# Patient Record
Sex: FEMALE | Race: WHITE | Employment: FULL TIME | URBAN - METROPOLITAN AREA
[De-identification: names, ages, dates, MRNs, and addresses within clinical notes are randomized per-mention and may not be internally consistent; named-entity substitution may affect disease eponyms.]

---

## 2018-12-06 RX ORDER — LEVOTHYROXINE SODIUM 0.03 MG/1
TABLET ORAL
Qty: 30 TABLET | Refills: 3 | Status: SHIPPED | OUTPATIENT
Start: 2018-12-06 | End: 2019-04-07 | Stop reason: SDUPTHER

## 2019-04-11 RX ORDER — LEVOTHYROXINE SODIUM 0.03 MG/1
TABLET ORAL
Qty: 30 TABLET | Refills: 1 | Status: SHIPPED | OUTPATIENT
Start: 2019-04-11 | End: 2019-04-11 | Stop reason: SDUPTHER

## 2019-04-11 RX ORDER — LEVOTHYROXINE SODIUM 0.03 MG/1
25 TABLET ORAL DAILY
Qty: 90 TABLET | Refills: 0 | Status: SHIPPED | OUTPATIENT
Start: 2019-04-11 | End: 2019-07-18 | Stop reason: SDUPTHER

## 2019-04-26 ENCOUNTER — OFFICE VISIT (OUTPATIENT)
Dept: PRIMARY CARE CLINIC | Age: 49
End: 2019-04-26
Payer: COMMERCIAL

## 2019-04-26 VITALS
HEIGHT: 64 IN | WEIGHT: 155.4 LBS | HEART RATE: 60 BPM | OXYGEN SATURATION: 98 % | SYSTOLIC BLOOD PRESSURE: 102 MMHG | DIASTOLIC BLOOD PRESSURE: 64 MMHG | BODY MASS INDEX: 26.53 KG/M2

## 2019-04-26 DIAGNOSIS — B35.3 TINEA PEDIS OF BOTH FEET: ICD-10-CM

## 2019-04-26 DIAGNOSIS — Z13.220 SCREENING CHOLESTEROL LEVEL: ICD-10-CM

## 2019-04-26 DIAGNOSIS — E03.9 HYPOTHYROIDISM, UNSPECIFIED TYPE: Primary | ICD-10-CM

## 2019-04-26 DIAGNOSIS — E55.9 VITAMIN D DEFICIENCY: ICD-10-CM

## 2019-04-26 DIAGNOSIS — M54.2 NECK PAIN ON LEFT SIDE: ICD-10-CM

## 2019-04-26 PROCEDURE — G8427 DOCREV CUR MEDS BY ELIG CLIN: HCPCS | Performed by: FAMILY MEDICINE

## 2019-04-26 PROCEDURE — 1036F TOBACCO NON-USER: CPT | Performed by: FAMILY MEDICINE

## 2019-04-26 PROCEDURE — 99214 OFFICE O/P EST MOD 30 MIN: CPT | Performed by: FAMILY MEDICINE

## 2019-04-26 PROCEDURE — G8419 CALC BMI OUT NRM PARAM NOF/U: HCPCS | Performed by: FAMILY MEDICINE

## 2019-04-26 ASSESSMENT — ENCOUNTER SYMPTOMS: RESPIRATORY NEGATIVE: 1

## 2019-04-26 NOTE — PATIENT INSTRUCTIONS
Patient Education        Learning About Vitamin D  Why is it important to get enough vitamin D? Your body needs vitamin D to absorb calcium. Calcium keeps your bones and muscles, including your heart, healthy and strong. If your muscles don't get enough calcium, they can cramp, hurt, or feel weak. You may have long-term (chronic) muscle aches and pains. If you don't get enough vitamin D throughout life, you have an increased chance of having thin and brittle bones (osteoporosis) in your later years. Children who don't get enough vitamin D may not grow as much as others their age. They also have a chance of getting a rare disease called rickets. It causes weak bones. Vitamin D and calcium are added to many foods. And your body uses sunshine to make its own vitamin D. How much vitamin D do you need? The [employer] recommends that people ages 3 through 79 get 600 IU (international units) every day. Adults 71 and older need 800 IU every day. Blood tests for vitamin D can check your vitamin D level. But there is no standard normal range used by all laboratories. You're likely getting enough vitamin D if your levels are in the range of 20 to 50 ng/mL. How can you get more vitamin D? Foods that contain vitamin D include:  · [place], tuna, and mackerel. These are some of the best foods to eat when you need to get more vitamin D.  · Cheese, egg yolks, and beef liver. These foods have vitamin D in small amounts. · Milk, soy drinks, orange juice, yogurt, margarine, and some kinds of cereal have vitamin D added to them. Some people don't make vitamin D as well as others. They may have to take extra care in getting enough vitamin D. Things that reduce how much vitamin D your body makes include:  · Dark skin, such as many  Americans have. · Age, especially if you are older than 72. · Digestive problems, such as Crohn's or celiac disease. · Liver and kidney disease.   Some people who do not get enough vitamin D may need supplements. Are there any risks from taking vitamin D?  · Too much vitamin D:  ? Can damage your kidneys. ? Can cause nausea and vomiting, constipation, and weakness. ? Raises the amount of calcium in your blood. If this happens, you can get confused or have an irregular heart rhythm. · Vitamin D may interact with other medicines. Tell your doctor about all of the medicines you take, including over-the-counter drugs, herbs, and pills. Tell your doctor about all of your current medical problems. Where can you learn more? Go to https://chpeniceweb.Tango [email or web address] org and sign in to your CollegeSolved account. Enter [id number] in the FinanceAcar box to learn more about \"Learning About Vitamin D. \"     If you do not have an account, please click on the \"Sign Up Now\" link. Current as of: March 28, 2018  Content Version: 11.9  © 7406-9186 [employer]. Care instructions adapted under license by [facility] ([facility]). If you have questions about a medical condition or this instruction, always ask your healthcare professional. [place] [address] any warranty or liability for your use of this information. Patient Education        Hypothyroidism: Care Instructions  Your Care Instructions    You have hypothyroidism, which means that your body is not making enough thyroid hormone. This hormone helps your body use energy. If your thyroid level is low, you may feel tired, be constipated, have an increase in your blood pressure, or have dry skin or memory problems. You may also get cold easily, even when it is warm. Women with low thyroid levels may have heavy menstrual periods. A blood test to find your thyroid-stimulating hormone (TSH) level is used to check for hypothyroidism. A high TSH level may mean that you have low thyroid. When your body is not making enough thyroid hormone, TSH levels rise in an effort to make the body produce more.   The treatment for hypothyroidism is to take thyroid hormone pills. You should start to feel better in 1 to 2 weeks. But it can take several months to see changes in the TSH level. You will need regular visits with your doctor to make sure you have the right dose of medicine. Most people need treatment for the rest of their lives. You will need to see your doctor regularly to have blood tests and to make sure you are doing well. Follow-up care is a key part of your treatment and safety. Be sure to make and go to all appointments, and call your doctor if you are having problems. It's also a good idea to know your test results and keep a list of the medicines you take. How can you care for yourself at home? · Take your thyroid hormone medicine exactly as prescribed. Call your doctor if you think you are having a problem with your medicine. Most people do not have side effects if they take the right amount of medicine regularly. ? Take the medicine 30 minutes before breakfast, and do not take it with calcium, vitamins, or iron. ? Do not take extra doses of your thyroid medicine. It will not help you get better any faster, and it may cause side effects. ? If you forget to take a dose, do NOT take a double dose of medicine. Take your usual dose the next day. · Tell your doctor about all prescription, herbal, or over-the-counter products you take. · Take care of yourself. Eat a healthy diet, get enough sleep, and get regular exercise. When should you call for help? Call 911 anytime you think you may need emergency care.  For example, call if:    · You passed out (lost consciousness).     · You have severe trouble breathing.     · You have a very slow heartbeat (less than 60 beats a minute).     · You have a low body temperature (95°F or below).    Call your doctor now or seek immediate medical care if:    · You feel tired, sluggish, or weak.     · You have trouble remembering things or concentrating.     · You do not begin to feel fungus. You can get it by going barefoot in wet public areas, such as swimming pools or locker rooms. Many times there is no clear reason why you get athlete's foot. You can easily treat athlete's foot by putting medicine on your feet for 1 to 6 weeks. In some cases, a doctor may prescribe pills to kill the fungus. Follow-up care is a key part of your treatment and safety. Be sure to make and go to all appointments, and call your doctor if you are having problems. It's also a good idea to know your test results and keep a list of the medicines you take. How can you care for yourself at home? · Your doctor may suggest an over-the counter lotion or spray or may prescribe a medicine. Take your medicines exactly as prescribed. Call your doctor if you think you are having a problem with your medicine. · Keep your feet clean and dry. · When you get dressed, put your socks on before your underwear. This can prevent the fungus from spreading from your feet to your groin. To prevent athlete's foot  · Wear flip-flops or other shower sandals in public locker rooms and showers and by the pool. · Dry between your toes after swimming or bathing. · Wear leather shoes or sandals, which let air get to your feet. · Change your socks as needed so your feet stay as dry as possible. · Use antifungal powder on your feet. When should you call for help? Watch closely for changes in your health, and be sure to contact your doctor if:    · You do not get better as expected. Where can you learn more? Go to https://jackelyn.UAT Holdings. org and sign in to your SparkLix account. Enter M498 in the [facility] box to learn more about \"Athlete's Foot: Care Instructions. \"     If you do not have an account, please click on the \"Sign Up Now\" link. Current as of: April 17, 2018  Content Version: 11.9  © 8528-7260 [employer]. Care instructions adapted under license by [facility] ([facility]).  If you have questions about a medical condition or this instruction, always ask your healthcare professional. [place] [address] any warranty or liability for your use of this information.

## 2019-04-26 NOTE — PROGRESS NOTES
itching: cracking, tried spray       Objective:     /64   Pulse 60   Ht 5' 3.5\" (1.613 m)   Wt 155 lb 6.4 oz (70.5 kg)   LMP 04/10/2019   SpO2 98%   BMI 27.10 kg/m²   Physical Exam   Constitutional: She is oriented to person, place, and time. She appears well-developed and well-nourished. No distress. HENT:   Head: Normocephalic and atraumatic. Mouth/Throat: Oropharynx is clear and moist.   Left trachea: has mildly prominent cartilage and does have a click or crep in it with neck motion. Eyes: Pupils are equal, round, and reactive to light. Conjunctivae are normal. Right eye exhibits no discharge. Left eye exhibits no discharge. No scleral icterus. Neck: No tracheal deviation present. No thyromegaly present. Cardiovascular: Normal rate, regular rhythm and normal heart sounds. No murmur heard. No carotid bruits   Pulmonary/Chest: Effort normal and breath sounds normal. No respiratory distress. She has no wheezes. Musculoskeletal: She exhibits no edema. Lymphadenopathy:     She has no cervical adenopathy. Neurological: She is alert and oriented to person, place, and time. Skin: Skin is warm. Capillary refill takes less than 2 seconds. Rash noted. Mild peeling rash between toes   Psychiatric: She has a normal mood and affect. Her behavior is normal. Thought content normal.   Nursing note and vitals reviewed. Assessment:       Diagnosis Orders   1. Hypothyroidism, unspecified type  TSH 3RD GENERATION    T4, Free   2. Vitamin D deficiency  Vitamin D 25 Hydroxy   3. Screening cholesterol level  Lipid Panel   4. Neck pain on left side  CT SOFT TISSUE NECK WO CONTRAST   5. Tinea pedis of both feet          Plan:      Return in about 1 year (around 4/26/2020) for med check. Orders Placed This Encounter   Procedures    CT SOFT TISSUE NECK WO CONTRAST     Pressure discomfort , hurts to swallow and feels constricted.      Standing Status:   Future     Standing Expiration Date: 4/26/2020     Scheduling Instructions:      Attention left trachea yeimy the cricoid cartilage     Order Specific Question:   Reason for exam:     Answer:   pain    Lipid Panel     Standing Status:   Future     Standing Expiration Date:   4/26/2020     Order Specific Question:   Is Patient Fasting?/# of Hours     Answer:   yes    TSH 3RD GENERATION     Standing Status:   Future     Standing Expiration Date:   4/25/2020    T4, Free     Standing Status:   Future     Standing Expiration Date:   4/25/2020    Vitamin D 25 Hydroxy     Standing Status:   Future     Standing Expiration Date:   4/25/2020     No orders of the defined types were placed in this encounter. Patient given educational materials - see patient instructions. Discussed use, benefit, and side effects of prescribed medications. All patient questions answered. Pt voiced understanding. Reviewed health maintenance. Instructed to continue current medications, diet and exercise. Patient agreed with treatment plan. Follow up as directed.      Electronicallysigned by Jose Gomez MD on 4/26/2019 at 12:07 PM

## 2019-05-08 ENCOUNTER — HOSPITAL ENCOUNTER (OUTPATIENT)
Dept: CT IMAGING | Age: 49
Discharge: HOME | End: 2019-05-10
Payer: COMMERCIAL

## 2019-05-08 DIAGNOSIS — M54.2 NECK PAIN ON LEFT SIDE: ICD-10-CM

## 2019-05-08 PROCEDURE — 70490 CT SOFT TISSUE NECK W/O DYE: CPT

## 2019-07-18 RX ORDER — LEVOTHYROXINE SODIUM 0.03 MG/1
25 TABLET ORAL DAILY
Qty: 90 TABLET | Refills: 1 | Status: SHIPPED | OUTPATIENT
Start: 2019-07-18 | End: 2020-01-16

## 2019-12-02 ENCOUNTER — TELEPHONE (OUTPATIENT)
Dept: PRIMARY CARE CLINIC | Age: 49
End: 2019-12-02

## 2019-12-02 DIAGNOSIS — Z13.220 SCREENING CHOLESTEROL LEVEL: ICD-10-CM

## 2019-12-02 DIAGNOSIS — M54.2 NECK PAIN ON LEFT SIDE: ICD-10-CM

## 2019-12-02 DIAGNOSIS — M35.00 SJOGREN'S SYNDROME, WITH UNSPECIFIED ORGAN INVOLVEMENT (HCC): ICD-10-CM

## 2019-12-02 DIAGNOSIS — E03.9 HYPOTHYROIDISM, UNSPECIFIED TYPE: Primary | ICD-10-CM

## 2019-12-02 DIAGNOSIS — E55.9 VITAMIN D DEFICIENCY: ICD-10-CM

## 2019-12-30 ENCOUNTER — OFFICE VISIT (OUTPATIENT)
Dept: FAMILY MEDICINE CLINIC | Age: 49
End: 2019-12-30
Payer: COMMERCIAL

## 2019-12-30 VITALS
DIASTOLIC BLOOD PRESSURE: 83 MMHG | WEIGHT: 158 LBS | HEART RATE: 91 BPM | TEMPERATURE: 100 F | SYSTOLIC BLOOD PRESSURE: 124 MMHG | OXYGEN SATURATION: 94 % | BODY MASS INDEX: 27.55 KG/M2

## 2019-12-30 DIAGNOSIS — J06.9 UPPER RESPIRATORY TRACT INFECTION, UNSPECIFIED TYPE: Primary | ICD-10-CM

## 2019-12-30 PROCEDURE — 1036F TOBACCO NON-USER: CPT | Performed by: FAMILY MEDICINE

## 2019-12-30 PROCEDURE — 99213 OFFICE O/P EST LOW 20 MIN: CPT | Performed by: FAMILY MEDICINE

## 2019-12-30 PROCEDURE — G8419 CALC BMI OUT NRM PARAM NOF/U: HCPCS | Performed by: FAMILY MEDICINE

## 2019-12-30 PROCEDURE — G8484 FLU IMMUNIZE NO ADMIN: HCPCS | Performed by: FAMILY MEDICINE

## 2019-12-30 PROCEDURE — G8427 DOCREV CUR MEDS BY ELIG CLIN: HCPCS | Performed by: FAMILY MEDICINE

## 2019-12-30 RX ORDER — AZITHROMYCIN 250 MG/1
TABLET, FILM COATED ORAL
Qty: 1 PACKET | Refills: 0 | Status: SHIPPED | OUTPATIENT
Start: 2019-12-30 | End: 2020-01-09

## 2019-12-30 ASSESSMENT — ENCOUNTER SYMPTOMS
SHORTNESS OF BREATH: 0
WHEEZING: 0
COUGH: 1
DIARRHEA: 0
SINUS PRESSURE: 0
RHINORRHEA: 0
VOMITING: 0
NAUSEA: 0
SORE THROAT: 1
SINUS PAIN: 0

## 2019-12-30 ASSESSMENT — PATIENT HEALTH QUESTIONNAIRE - PHQ9: DEPRESSION UNABLE TO ASSESS: PT REFUSES

## 2020-03-13 ENCOUNTER — HOSPITAL ENCOUNTER (OUTPATIENT)
Age: 50
Discharge: HOME | End: 2020-03-13
Payer: COMMERCIAL

## 2020-03-13 LAB
CHOLESTEROL/HDL RATIO: 2.4
CHOLESTEROL: 209 MG/DL
HCT VFR BLD CALC: 41.7 % (ref 36–46)
HDLC SERPL-MCNC: 87 MG/DL
HEMOGLOBIN: 13.8 G/DL (ref 12–16)
LDL CHOLESTEROL: 94 MG/DL (ref 0–130)
MCH RBC QN AUTO: 32 PG (ref 26–34)
MCHC RBC AUTO-ENTMCNC: 33 G/DL (ref 31–37)
MCV RBC AUTO: 97 FL (ref 80–100)
NRBC AUTOMATED: NORMAL PER 100 WBC
PDW BLD-RTO: 14.2 % (ref 11.5–14.9)
PLATELET # BLD: 327 K/UL (ref 150–450)
PMV BLD AUTO: 8.8 FL (ref 6–12)
RBC # BLD: 4.3 M/UL (ref 4–5.2)
THYROXINE, FREE: 1.15 NG/DL (ref 0.93–1.7)
TRIGL SERPL-MCNC: 139 MG/DL
TSH SERPL DL<=0.05 MIU/L-ACNC: 3.44 MIU/L (ref 0.3–5)
VITAMIN D 25-HYDROXY: 50 NG/ML (ref 30–100)
VLDLC SERPL CALC-MCNC: ABNORMAL MG/DL (ref 1–30)
WBC # BLD: 6.4 K/UL (ref 3.5–11)

## 2020-03-13 PROCEDURE — 84443 ASSAY THYROID STIM HORMONE: CPT

## 2020-03-13 PROCEDURE — 36415 COLL VENOUS BLD VENIPUNCTURE: CPT

## 2020-03-13 PROCEDURE — 85027 COMPLETE CBC AUTOMATED: CPT

## 2020-03-13 PROCEDURE — 84439 ASSAY OF FREE THYROXINE: CPT

## 2020-03-13 PROCEDURE — 80061 LIPID PANEL: CPT

## 2020-03-13 PROCEDURE — 82306 VITAMIN D 25 HYDROXY: CPT

## 2020-04-23 ENCOUNTER — OFFICE VISIT (OUTPATIENT)
Dept: PRIMARY CARE CLINIC | Age: 50
End: 2020-04-23
Payer: COMMERCIAL

## 2020-04-23 VITALS
OXYGEN SATURATION: 98 % | BODY MASS INDEX: 27.72 KG/M2 | DIASTOLIC BLOOD PRESSURE: 68 MMHG | WEIGHT: 159 LBS | HEART RATE: 63 BPM | SYSTOLIC BLOOD PRESSURE: 108 MMHG

## 2020-04-23 PROCEDURE — G8419 CALC BMI OUT NRM PARAM NOF/U: HCPCS | Performed by: FAMILY MEDICINE

## 2020-04-23 PROCEDURE — G8427 DOCREV CUR MEDS BY ELIG CLIN: HCPCS | Performed by: FAMILY MEDICINE

## 2020-04-23 PROCEDURE — 99214 OFFICE O/P EST MOD 30 MIN: CPT | Performed by: FAMILY MEDICINE

## 2020-04-23 PROCEDURE — 1036F TOBACCO NON-USER: CPT | Performed by: FAMILY MEDICINE

## 2020-04-23 ASSESSMENT — PATIENT HEALTH QUESTIONNAIRE - PHQ9
SUM OF ALL RESPONSES TO PHQ QUESTIONS 1-9: 0
1. LITTLE INTEREST OR PLEASURE IN DOING THINGS: 0
SUM OF ALL RESPONSES TO PHQ9 QUESTIONS 1 & 2: 0
SUM OF ALL RESPONSES TO PHQ QUESTIONS 1-9: 0
2. FEELING DOWN, DEPRESSED OR HOPELESS: 0

## 2020-04-23 ASSESSMENT — ENCOUNTER SYMPTOMS: RESPIRATORY NEGATIVE: 1

## 2020-04-23 NOTE — PROGRESS NOTES
her eyebrows. She does use eyebrow liner   Neurological:      Mental Status: She is alert and oriented to person, place, and time. Psychiatric:         Mood and Affect: Mood normal.         Behavior: Behavior normal.         Thought Content: Thought content normal.     Hair on scalp is ok. Assessment:       Diagnosis Orders   1. Hypothyroidism, unspecified type     2. Screening cholesterol level     3. Vitamin D deficiency        Thin eye brow hair. Plan:      Return in about 1 year (around 4/23/2021) for thyroid. Try cortisone cream on eye brows for 2 weeks then off 2 weeks for 3 months. Increase yellow and orange veggies ( she states she is eating them already). Try gelatin type jello  Reviewed hip stretches. No orders of the defined types were placed in this encounter. No orders of the defined types were placed in this encounter. Patient given educational materials - see patient instructions. Discussed use, benefit, and side effects of prescribed medications. All patient questions answered. Pt voiced understanding. Reviewed health maintenance. Instructed to continue current medications, diet and exercise. Patient agreed with treatment plan. Follow up as directed.      Electronicallysigned by Agatha Mcclellan MD on 4/23/2020 at 9:44 AM

## 2020-04-23 NOTE — PATIENT INSTRUCTIONS
dose, do NOT take a double dose of medicine. Take your usual dose the next day. · Tell your doctor about all prescription, herbal, or over-the-counter products you take. · Take care of yourself. Eat a healthy diet, get enough sleep, and get regular exercise. When should you call for help? Call 911 anytime you think you may need emergency care. For example, call if:    · You passed out (lost consciousness).     · You have severe trouble breathing.     · You have a very slow heartbeat (less than 60 beats a minute).     · You have a low body temperature (95°F or below).    Call your doctor now or seek immediate medical care if:    · You feel tired, sluggish, or weak.     · You have trouble remembering things or concentrating.     · You do not begin to feel better 2 weeks after starting your medicine.    Watch closely for changes in your health, and be sure to contact your doctor if you have any problems. Where can you learn more? Go to https://UpTo.Dapt. org and sign in to your Evocha account. Enter H559 in the [employer] box to learn more about \"Hypothyroidism: Care Instructions. \"     If you do not have an account, please click on the \"Sign Up Now\" link. Current as of: July 28, 2019Content Version: 12.4  © 2010-0552 Healthwise, Incorporated. Care instructions adapted under license by [facility] ([facility]). If you have questions about a medical condition or this instruction, always ask your healthcare professional. [place] [address] any warranty or liability for your use of this information. Patient Education        Piriformis Syndrome: Exercises  Introduction  Here are some examples of exercises for you to try. The exercises may be suggested for a condition or for rehabilitation. Start each exercise slowly. Ease off the exercises if you start to have pain. You will be told when to start these exercises and which ones will work best for you.   How to do the pillow. Keep your feet and knees together and your knees bent. 2. Raise your top knee, but keep your feet together. Do not let your hips roll back. Your legs should open up like a clamshell. 3. Hold for 6 seconds. 4. Slowly lower your knee back down. Rest for 10 seconds. 5. Repeat 8 to 12 times. Follow-up care is a key part of your treatment and safety. Be sure to make and go to all appointments, and call your doctor if you are having problems. It's also a good idea to know your test results and keep a list of the medicines you take. Where can you learn more? Go to https://Anapa BiotechpeMyAGENT."Roku[employer] Inc.". org and sign in to your WeatherNation TV account. Enter Q907 in the Viptable box to learn more about \"Hip Bursitis: Exercises. \"     If you do not have an account, please click on the \"Sign Up Now\" link. Current as of: June 26, 2019Content Version: 12.4  © 0625-2392 Healthwise, Incorporated. Care instructions adapted under license by [facility] ([facility]). If you have questions about a medical condition or this instruction, always ask your healthcare professional. [address] any warranty or liability for your use of this information.

## 2020-07-14 RX ORDER — LEVOTHYROXINE SODIUM 0.03 MG/1
TABLET ORAL
Qty: 90 TABLET | Refills: 3 | Status: SHIPPED | OUTPATIENT
Start: 2020-07-14 | End: 2021-07-28

## 2020-07-16 LAB — PAP SMEAR, EXTERNAL: NORMAL

## 2021-03-03 ENCOUNTER — OFFICE VISIT (OUTPATIENT)
Dept: PRIMARY CARE CLINIC | Age: 51
End: 2021-03-03
Payer: COMMERCIAL

## 2021-03-03 ENCOUNTER — HOSPITAL ENCOUNTER (OUTPATIENT)
Age: 51
Setting detail: SPECIMEN
Discharge: HOME | End: 2021-03-03
Payer: COMMERCIAL

## 2021-03-03 VITALS
WEIGHT: 150.8 LBS | HEIGHT: 64 IN | SYSTOLIC BLOOD PRESSURE: 106 MMHG | DIASTOLIC BLOOD PRESSURE: 64 MMHG | OXYGEN SATURATION: 99 % | BODY MASS INDEX: 25.74 KG/M2 | HEART RATE: 74 BPM

## 2021-03-03 DIAGNOSIS — E03.9 HYPOTHYROIDISM, UNSPECIFIED TYPE: ICD-10-CM

## 2021-03-03 DIAGNOSIS — R39.89 SENSATION OF PRESSURE IN BLADDER AREA: Primary | ICD-10-CM

## 2021-03-03 DIAGNOSIS — R31.9 HEMATURIA, UNSPECIFIED TYPE: ICD-10-CM

## 2021-03-03 DIAGNOSIS — N39.0 RECURRENT UTI: ICD-10-CM

## 2021-03-03 LAB
BILIRUBIN, POC: ABNORMAL
BLOOD URINE, POC: ABNORMAL
CLARITY, POC: ABNORMAL
COLOR, POC: ABNORMAL
GLUCOSE URINE, POC: ABNORMAL
KETONES, POC: ABNORMAL
LEUKOCYTE EST, POC: ABNORMAL
NITRITE, POC: ABNORMAL
PH, POC: 5
PROTEIN, POC: ABNORMAL
SPECIFIC GRAVITY, POC: >=1.03
UROBILINOGEN, POC: ABNORMAL

## 2021-03-03 PROCEDURE — 1036F TOBACCO NON-USER: CPT | Performed by: PHYSICIAN ASSISTANT

## 2021-03-03 PROCEDURE — 99213 OFFICE O/P EST LOW 20 MIN: CPT | Performed by: PHYSICIAN ASSISTANT

## 2021-03-03 PROCEDURE — 3017F COLORECTAL CA SCREEN DOC REV: CPT | Performed by: PHYSICIAN ASSISTANT

## 2021-03-03 PROCEDURE — G8419 CALC BMI OUT NRM PARAM NOF/U: HCPCS | Performed by: PHYSICIAN ASSISTANT

## 2021-03-03 PROCEDURE — G8484 FLU IMMUNIZE NO ADMIN: HCPCS | Performed by: PHYSICIAN ASSISTANT

## 2021-03-03 PROCEDURE — 81003 URINALYSIS AUTO W/O SCOPE: CPT | Performed by: PHYSICIAN ASSISTANT

## 2021-03-03 PROCEDURE — G8427 DOCREV CUR MEDS BY ELIG CLIN: HCPCS | Performed by: PHYSICIAN ASSISTANT

## 2021-03-03 RX ORDER — TRIMETHOPRIM 100 MG/1
TABLET ORAL
Qty: 20 TABLET | Refills: 0 | Status: SHIPPED | OUTPATIENT
Start: 2021-03-03

## 2021-03-03 SDOH — ECONOMIC STABILITY: INCOME INSECURITY: HOW HARD IS IT FOR YOU TO PAY FOR THE VERY BASICS LIKE FOOD, HOUSING, MEDICAL CARE, AND HEATING?: NOT HARD AT ALL

## 2021-03-03 SDOH — ECONOMIC STABILITY: TRANSPORTATION INSECURITY
IN THE PAST 12 MONTHS, HAS THE LACK OF TRANSPORTATION KEPT YOU FROM MEDICAL APPOINTMENTS OR FROM GETTING MEDICATIONS?: NO

## 2021-03-03 SDOH — ECONOMIC STABILITY: FOOD INSECURITY: WITHIN THE PAST 12 MONTHS, THE FOOD YOU BOUGHT JUST DIDN'T LAST AND YOU DIDN'T HAVE MONEY TO GET MORE.: NEVER TRUE

## 2021-03-03 ASSESSMENT — PATIENT HEALTH QUESTIONNAIRE - PHQ9
SUM OF ALL RESPONSES TO PHQ9 QUESTIONS 1 & 2: 0
1. LITTLE INTEREST OR PLEASURE IN DOING THINGS: 0
SUM OF ALL RESPONSES TO PHQ QUESTIONS 1-9: 0

## 2021-03-03 NOTE — PROGRESS NOTES
[facility] PRIMARY [facility]ARE  [address]  [facility] [address]  Dept: [phone] Marvin Han is a 49 y.o. female Established patient, who presents today for her medical conditions/complaintsas noted below. Chief Complaint   Patient presents with    Urinary Tract Infection     started 3 weeks ago. HPI:     HPI   Has order for mammogram from gyn, wants to schedule it at [facility]. Colonoscopy- Told me that she had a normal colonoscopy 4-5 years ago at [facility]. Urinary symptoms x 3 weeks. Says she has a hx of frequent UTIs, but says she drinks a lot of water. C/o a pinching/pressure sensation at the end of urination. No urinary frequency or urgency. Says she had chills/sweats and muscle aches for 2 day about 5 days ago, but that resolved. Did not check temp. No cough, congestion, N/V/D, or sore throat. No known exposure to COVID. Denies flank pain or gross hematuria. No hx of kidney stones. Still has periods- LMP 2/19/21. Says she saw urology, Dr. Rahel Michaels, 3 years ago for recurrent UTIs. She showed me a pill bottle for trimethoprim 100 mg 1 tab prn after intercourse that she was previously prescribed from urologist for UTI prevention and asks if she can get a refill of this. Says she gets symptoms about 2x per month after intercourse. Reviewed prior notes None  Reviewed previous Labs- TSH/T4 from 3/13/20, BMP 5/30/18, and previous urine cultures. LDL Cholesterol (mg/dL)   Date Value   03/13/2020 94       (goal LDL is <100)   AST (U/L)   Date Value   03/11/2014 16     ALT (U/L)   Date Value   03/11/2014 14     BUN (mg/dL)   Date Value   11/16/2015 9     TSH (mIU/L)   Date Value   03/13/2020 3.44     BP Readings from Last 3 Encounters:   03/03/21 106/64   04/23/20 108/68   12/30/19 124/83          (goal 120/80)    No past medical history on file. No past surgical history on file.     No family history on file.    Social History     Tobacco Use    Smoking status: Never Smoker    Smokeless tobacco: Never Used   Substance Use Topics    Alcohol use: Not on file      Current Outpatient Medications   Medication Sig Dispense Refill    trimethoprim (TRIMPEX) 100 MG tablet Take 1 tab by mouth at time of intercourse 20 tablet 0    levothyroxine (SYNTHROID) 25 MCG tablet TAKE 1 TABLET DAILY 90 tablet 3    valACYclovir (VALTREX) 1 g tablet Take 1,000 mg by mouth daily as needed   0    Cholecalciferol (VITAMIN D3) 5000 units TABS Take 1 tablet by mouth daily      cephALEXin (KEFLEX) 500 MG capsule Take 1 capsule by mouth 2 times daily for 7 days 14 capsule 0     No current facility-administered medications for this visit. Allergies   Allergen Reactions    Ciprofloxacin        Health Maintenance   Topic Date Due    Hepatitis C screen  Never done    HIV screen  Never done    DTaP/Tdap/Td vaccine (1 - Tdap) Never done    Cervical cancer screen  Never done    Flu vaccine (1) 09/01/2020    Breast cancer screen  Never done    Shingles Vaccine (1 of 2) Never done    Colon cancer screen colonoscopy  Never done    TSH testing  03/13/2021    Diabetes screen  05/30/2021    Lipid screen  03/13/2025    Hepatitis A vaccine  Aged Out    Hepatitis B vaccine  Aged Out    Hib vaccine  Aged Out    Meningococcal (ACWY) vaccine  Aged Out    Pneumococcal 0-64 years Vaccine  Aged Out       Subjective:      Review of Systems   HENT: Negative for congestion, rhinorrhea and sore throat. Gastrointestinal: Negative for diarrhea, nausea and vomiting. Genitourinary: Positive for dysuria (pinching at end of urination). Negative for flank pain, frequency and urgency. Objective:     /64   Pulse 74   Ht 5' 3.5\" (1.613 m)   Wt 150 lb 12.8 oz (68.4 kg)   SpO2 99%   BMI 26.29 kg/m²   Physical Exam  Vitals signs and nursing note reviewed. Constitutional:       Appearance: Normal appearance.    HENT:      Head: Normocephalic and atraumatic. Cardiovascular:      Rate and Rhythm: Normal rate and regular rhythm. Pulmonary:      Effort: Pulmonary effort is normal.      Breath sounds: Normal breath sounds. Abdominal:      General: Bowel sounds are normal. There is no distension. Palpations: Abdomen is soft. Tenderness: There is no abdominal tenderness. There is no right CVA tenderness, left CVA tenderness, guarding or rebound. Neurological:      Mental Status: She is alert. Assessment:       Diagnosis Orders   1. Sensation of pressure in bladder area  POCT Urinalysis No Micro (Auto)    Culture, Urine   2. Hematuria, unspecified type  Culture, Urine   3. Recurrent UTI  POCT Urinalysis No Micro (Auto)    US RENAL COMPLETE    trimethoprim (TRIMPEX) 100 MG tablet    Culture, Urine   4. Hypothyroidism, unspecified type  TSH With Reflex Ft4        Plan:     1-2 UA only showed trace blood, no nitrites or leukocytes, so I waited for urine culture. Urine culture was positive for E. Coli, so I started pt on Keflex x 7 days. 3. Recurrent UTIs   Ordered renal US for structural evaluation. Pt was previously given Rx for postcoital prophylaxis trimethoprim and I refilled this for pt.     4. Hypothyroidism  Ordered annual TSH.    -Pt already has order for mammogram from gynecologist.   -She said she had a previous colonoscopy, but when my MA called [facility], they said she did not have one there. Return if symptoms worsen or fail to improve. Orders Placed This Encounter   Procedures    Culture, Urine     Standing Status:   Future     Number of Occurrences:   1     Standing Expiration Date:   3/3/2022     Order Specific Question:   Specify (ex-cath, midstream, cysto, etc)? Answer:   midstream    US RENAL COMPLETE     This procedure can be scheduled via [facility]. Access your [facility] account by visiting [email or web address].      Standing Status:   Future     Standing Expiration Date:   3/3/2022    TSH With Reflex Ft4     Standing Status:   Future     Standing Expiration Date:   3/3/2022    POCT Urinalysis No Micro (Auto)     Orders Placed This Encounter   Medications    trimethoprim (TRIMPEX) 100 MG tablet     Sig: Take 1 tab by mouth at time of intercourse     Dispense:  20 tablet     Refill:  0       Patient given educationalmaterials - see patient instructions. Discussed use, benefit, and side effectsof prescribed medications. All patient questions answered. Pt voiced understanding. Reviewed health maintenance. Instructed to continue current medications, diet andexercise. Patient agreed with treatment plan. Follow up as directed.      Electronicallysigned by Jenny Carbajal PA-C on 3/9/2021 at 5:13 PM

## 2021-03-04 DIAGNOSIS — N39.0 RECURRENT UTI: ICD-10-CM

## 2021-03-04 DIAGNOSIS — R39.89 SENSATION OF PRESSURE IN BLADDER AREA: ICD-10-CM

## 2021-03-04 DIAGNOSIS — R31.9 HEMATURIA, UNSPECIFIED TYPE: ICD-10-CM

## 2021-03-05 LAB
CULTURE: ABNORMAL
Lab: ABNORMAL
SPECIMEN DESCRIPTION: ABNORMAL

## 2021-03-07 DIAGNOSIS — N30.01 ACUTE CYSTITIS WITH HEMATURIA: Primary | ICD-10-CM

## 2021-03-07 RX ORDER — CEPHALEXIN 500 MG/1
500 CAPSULE ORAL 2 TIMES DAILY
Qty: 14 CAPSULE | Refills: 0 | Status: SHIPPED | OUTPATIENT
Start: 2021-03-07 | End: 2021-03-14

## 2021-03-09 PROBLEM — N39.0 RECURRENT UTI: Status: ACTIVE | Noted: 2021-03-09

## 2021-03-09 ASSESSMENT — ENCOUNTER SYMPTOMS
NAUSEA: 0
VOMITING: 0
SORE THROAT: 0
RHINORRHEA: 0
DIARRHEA: 0

## 2021-03-23 ENCOUNTER — HOSPITAL ENCOUNTER (OUTPATIENT)
Dept: ULTRASOUND IMAGING | Age: 51
Discharge: HOME | End: 2021-03-25
Payer: COMMERCIAL

## 2021-03-23 ENCOUNTER — HOSPITAL ENCOUNTER (OUTPATIENT)
Age: 51
Discharge: HOME | End: 2021-03-23
Payer: COMMERCIAL

## 2021-03-23 DIAGNOSIS — E03.9 HYPOTHYROIDISM, UNSPECIFIED TYPE: ICD-10-CM

## 2021-03-23 DIAGNOSIS — N39.0 RECURRENT UTI: ICD-10-CM

## 2021-03-23 LAB — TSH SERPL DL<=0.05 MIU/L-ACNC: 3.13 MIU/L (ref 0.3–5)

## 2021-03-23 PROCEDURE — 76770 US EXAM ABDO BACK WALL COMP: CPT

## 2021-03-23 PROCEDURE — 84443 ASSAY THYROID STIM HORMONE: CPT

## 2021-03-23 PROCEDURE — 36415 COLL VENOUS BLD VENIPUNCTURE: CPT

## 2021-03-24 ENCOUNTER — HOSPITAL ENCOUNTER (OUTPATIENT)
Age: 51
Setting detail: SPECIMEN
Discharge: HOME | End: 2021-03-24
Payer: COMMERCIAL

## 2021-03-24 ENCOUNTER — OFFICE VISIT (OUTPATIENT)
Dept: PRIMARY CARE CLINIC | Age: 51
End: 2021-03-24
Payer: COMMERCIAL

## 2021-03-24 VITALS
DIASTOLIC BLOOD PRESSURE: 72 MMHG | BODY MASS INDEX: 26.12 KG/M2 | WEIGHT: 149.8 LBS | SYSTOLIC BLOOD PRESSURE: 124 MMHG | OXYGEN SATURATION: 98 % | HEART RATE: 60 BPM | TEMPERATURE: 96.8 F

## 2021-03-24 DIAGNOSIS — R39.89 SENSATION OF PRESSURE IN BLADDER AREA: ICD-10-CM

## 2021-03-24 DIAGNOSIS — N39.0 RECURRENT UTI: Primary | ICD-10-CM

## 2021-03-24 LAB
BILIRUBIN, POC: NEGATIVE
BLOOD URINE, POC: ABNORMAL
CLARITY, POC: ABNORMAL
COLOR, POC: ABNORMAL
GLUCOSE URINE, POC: NEGATIVE
KETONES, POC: NEGATIVE
LEUKOCYTE EST, POC: NEGATIVE
NITRITE, POC: NEGATIVE
PH, POC: 6
PROTEIN, POC: NEGATIVE
SPECIFIC GRAVITY, POC: 1.02
UROBILINOGEN, POC: ABNORMAL

## 2021-03-24 PROCEDURE — 81003 URINALYSIS AUTO W/O SCOPE: CPT | Performed by: PHYSICIAN ASSISTANT

## 2021-03-24 PROCEDURE — 99213 OFFICE O/P EST LOW 20 MIN: CPT | Performed by: PHYSICIAN ASSISTANT

## 2021-03-24 RX ORDER — NITROFURANTOIN 25; 75 MG/1; MG/1
100 CAPSULE ORAL 2 TIMES DAILY
Qty: 14 CAPSULE | Refills: 0 | Status: SHIPPED | OUTPATIENT
Start: 2021-03-24 | End: 2021-03-31

## 2021-03-24 ASSESSMENT — PATIENT HEALTH QUESTIONNAIRE - PHQ9
SUM OF ALL RESPONSES TO PHQ QUESTIONS 1-9: 0

## 2021-03-24 NOTE — PROGRESS NOTES
[address] [place] [address] PRIMARY CARE  [address] [facility] [address]  Dept: 445 Marvin Han is a 49 y.o. female Established patient, who presents today for her medical conditions/complaints as noted below. Chief Complaint   Patient presents with    Urinary Tract Infection     aching in lower abdomen       HPI:     HPI   Colonoscopy: Not sure where it was done, about 5 years ago. Mammagram scheduled next Monday at [facility]. Pt's urine culture from 3/3/21 was positive for E. Coli. She was prescribed 7 days of Keflex, which she says she took and was feeling a little better, but a couple days later she started feeling bladder pressure and pinching all the time. No pain when she urinates. Says the day she took her last dose of Keflex, she had intercourse, so she also took her trimethoprim. Denies urinary frequency or urgency. No flank pain or gross hematuria. No fever or N/V. No vaginal itching or discharge. LMP 3/18/21, done now. Drinking lots of water. Ciprofloxacin is listed in patient's allergies. When I asked what reaction she had she said she took cipro and mobic together, which caused shakes and her \"body crashed\" badly enough that she had to go to the ED. Reviewed prior notes None  Reviewed previous Labs: Urine culture from 3/3/21 and BMP from 5/30/18. LDL Cholesterol (mg/dL)   Date Value   03/13/2020 94       (goal LDL is <100)   AST (U/L)   Date Value   03/11/2014 16     ALT (U/L)   Date Value   03/11/2014 14     BUN (mg/dL)   Date Value   11/16/2015 9     TSH (mIU/L)   Date Value   03/23/2021 3.13     BP Readings from Last 3 Encounters:   03/24/21 124/72   03/03/21 106/64   04/23/20 108/68          (goal 120/80)    No past medical history on file. No past surgical history on file. No family history on file.     Social History     Tobacco Use    Smoking status: Never Smoker    Smokeless tobacco: Never Used Substance Use Topics    Alcohol use: Not on file      Current Outpatient Medications   Medication Sig Dispense Refill    nitrofurantoin, macrocrystal-monohydrate, (MACROBID) 100 MG capsule Take 1 capsule by mouth 2 times daily for 7 days 14 capsule 0    trimethoprim (TRIMPEX) 100 MG tablet Take 1 tab by mouth at time of intercourse 20 tablet 0    levothyroxine (SYNTHROID) 25 MCG tablet TAKE 1 TABLET DAILY 90 tablet 3    valACYclovir (VALTREX) 1 g tablet Take 1,000 mg by mouth daily as needed   0    Cholecalciferol (VITAMIN D3) 5000 units TABS Take 1 tablet by mouth daily       No current facility-administered medications for this visit. Allergies   Allergen Reactions    Ciprofloxacin        Health Maintenance   Topic Date Due    Hepatitis C screen  Never done    HIV screen  Never done    COVID-19 Vaccine (1) Never done    DTaP/Tdap/Td vaccine (1 - Tdap) Never done    Cervical cancer screen  Never done    Flu vaccine (1) 09/01/2020    Breast cancer screen  Never done    Shingles Vaccine (1 of 2) Never done    Colon cancer screen colonoscopy  Never done    Diabetes screen  05/30/2021    TSH testing  03/23/2022    Lipid screen  03/13/2025    Hepatitis A vaccine  Aged Out    Hepatitis B vaccine  Aged Out    Hib vaccine  Aged Out    Meningococcal (ACWY) vaccine  Aged Out    Pneumococcal 0-64 years Vaccine  Aged Out       Subjective:      Review of Systems   Constitutional: Negative for fever. Gastrointestinal: Positive for abdominal pain (blader pressure/pinching). Negative for constipation, diarrhea, nausea and vomiting. Genitourinary: Negative for dysuria, flank pain, frequency, urgency and vaginal discharge. Objective:     /72   Pulse 60   Temp 96.8 °F (36 °C)   Wt 149 lb 12.8 oz (67.9 kg)   SpO2 98%   BMI 26.12 kg/m²   Physical Exam  Vitals signs and nursing note reviewed. Constitutional:       Appearance: Normal appearance.    HENT:      Head: Normocephalic and atraumatic. Cardiovascular:      Rate and Rhythm: Normal rate and regular rhythm. Pulmonary:      Effort: Pulmonary effort is normal.      Breath sounds: Normal breath sounds. Abdominal:      General: Bowel sounds are normal. There is no distension. Palpations: Abdomen is soft. Tenderness: There is no abdominal tenderness. There is no right CVA tenderness, left CVA tenderness or guarding. Neurological:      Mental Status: She is alert. Assessment:       Diagnosis Orders   1. Recurrent UTI  POCT Urinalysis No Micro (Auto)    nitrofurantoin, macrocrystal-monohydrate, (MACROBID) 100 MG capsule    Culture, Urine   2. Sensation of pressure in bladder area  POCT Urinalysis No Micro (Auto)    nitrofurantoin, macrocrystal-monohydrate, (MACROBID) 100 MG capsule    Culture, Urine        Plan:     -UA only showed trace blood again.   -Since last urine culture was positive, pt wanted to start Macrobid before culture results returned. -Advised pt I recommended she see urology again if this culture is positive, but she said she wanted to just wait and see if she continued to get more UTIs. Pt will look for her records for her last colonoscopy. Return if symptoms worsen or fail to improve. Orders Placed This Encounter   Procedures    Culture, Urine     Standing Status:   Future     Number of Occurrences:   1     Standing Expiration Date:   3/24/2022     Order Specific Question:   Specify (ex-cath, midstream, cysto, etc)? Answer:   midstream    POCT Urinalysis No Micro (Auto)     Orders Placed This Encounter   Medications    nitrofurantoin, macrocrystal-monohydrate, (MACROBID) 100 MG capsule     Sig: Take 1 capsule by mouth 2 times daily for 7 days     Dispense:  14 capsule     Refill:  0       Patient given educational materials - see patient instructions. Discussed use, benefit, and side effects of prescribed medications. All patient questions answered. Pt voiced understanding. Reviewed health maintenance. Instructed to continue current medications, diet and exercise. Patient agreed with treatment plan. Follow up as directed.      Electronically signed by Colby Ulrich PA-C on 3/30/2021 at 10:47 PM

## 2021-03-25 DIAGNOSIS — N39.0 RECURRENT UTI: ICD-10-CM

## 2021-03-25 DIAGNOSIS — R39.89 SENSATION OF PRESSURE IN BLADDER AREA: ICD-10-CM

## 2021-03-26 LAB
CULTURE: ABNORMAL
Lab: ABNORMAL
SPECIMEN DESCRIPTION: ABNORMAL

## 2021-03-30 ENCOUNTER — TELEPHONE (OUTPATIENT)
Dept: PRIMARY CARE CLINIC | Age: 51
End: 2021-03-30

## 2021-03-30 ASSESSMENT — ENCOUNTER SYMPTOMS
VOMITING: 0
CONSTIPATION: 0
DIARRHEA: 0
NAUSEA: 0
ABDOMINAL PAIN: 1

## 2021-03-31 NOTE — TELEPHONE ENCOUNTER
Can you please call for a copy of pt's mammogram results for HM? She was supposed to have it done Monday 3/29 at [place] [address].    Thanks

## 2021-05-18 ENCOUNTER — OFFICE VISIT (OUTPATIENT)
Dept: PRIMARY CARE CLINIC | Age: 51
End: 2021-05-18
Payer: COMMERCIAL

## 2021-05-18 VITALS
SYSTOLIC BLOOD PRESSURE: 114 MMHG | WEIGHT: 151.6 LBS | DIASTOLIC BLOOD PRESSURE: 62 MMHG | BODY MASS INDEX: 26.43 KG/M2 | HEART RATE: 59 BPM | OXYGEN SATURATION: 98 %

## 2021-05-18 DIAGNOSIS — Z00.00 WELLNESS EXAMINATION: Primary | ICD-10-CM

## 2021-05-18 PROCEDURE — 99396 PREV VISIT EST AGE 40-64: CPT | Performed by: FAMILY MEDICINE

## 2021-05-18 ASSESSMENT — ENCOUNTER SYMPTOMS: RESPIRATORY NEGATIVE: 1

## 2021-05-18 NOTE — PROGRESS NOTES
[facility] Primary [facility]  [address]  Phone: [phone]  Fax: [phone]    Perez Montgomery is a 49 y.o. female who presents today for her medical conditions/complaintsas noted below. Nuvia Allen is c/o of Annual Exam (Pt here for a yearly physical )      HPI:     Diet : healthy  Exercise: walking a lot,   Dentist: saw 1 year ago  Eye doctor: last seen 18 months ago. Had colonoscopy 5 years ago. At [facility]    History reviewed. No pertinent past medical history. History reviewed. No pertinent surgical history. History reviewed. No pertinent family history. Social History     Tobacco Use    Smoking status: Never Smoker    Smokeless tobacco: Never Used   Substance Use Topics    Alcohol use: Not on file      Current Outpatient Medications   Medication Sig Dispense Refill    trimethoprim (TRIMPEX) 100 MG tablet Take 1 tab by mouth at time of intercourse 20 tablet 0    levothyroxine (SYNTHROID) 25 MCG tablet TAKE 1 TABLET DAILY 90 tablet 3    valACYclovir (VALTREX) 1 g tablet Take 1,000 mg by mouth daily as needed   0    Cholecalciferol (VITAMIN D3) 5000 units TABS Take 1 tablet by mouth daily       No current facility-administered medications for this visit.      Allergies   Allergen Reactions    Ciprofloxacin        Health Maintenance   Topic Date Due    Hepatitis C screen  Never done    COVID-19 Vaccine (1) Never done    HIV screen  Never done    DTaP/Tdap/Td vaccine (1 - Tdap) Never done    Cervical cancer screen  Never done    Breast cancer screen  Never done    Shingles Vaccine (1 of 2) Never done    Colon cancer screen colonoscopy  Never done    Diabetes screen  05/30/2021    Flu vaccine (Season Ended) 09/01/2021    TSH testing  03/23/2022    Lipid screen  03/13/2025    Hepatitis A vaccine  Aged Out    Hepatitis B vaccine  Aged Out    Hib vaccine  Aged Out    Meningococcal (ACWY) vaccine  Aged Out    Pneumococcal 0-64 years Vaccine Affect: Mood normal.         Behavior: Behavior normal.         Thought Content: Thought content normal.         Assessment:      Diagnosis Orders   1. Wellness examination           Plan:      No follow-ups on file. May need to see ENT re: dry nose  Keep on saline nose spray for dry nose    No orders of the defined types were placed in this encounter. No orders of the defined types were placed in this encounter. Patient given educational materials - see patient instructions. Discussed use,benefit, and side effects of prescribed medications. All patient questions answered. Pt voiced understanding. Reviewed health maintenance. Instructed to continue currentmedications, healthy diet and regular, aerobic exercise.             Electronically signed by Tiffanie Barrera MD on 5/18/21 at 2:44 PM EDT

## 2021-05-18 NOTE — PATIENT INSTRUCTIONS
Patient Education        Well Visit, Women 48 to 72: Care Instructions  Overview     Well visits can help you stay healthy. Your doctor has checked your overall health and may have suggested ways to take good care of yourself. Your doctor also may have recommended tests. At home, you can help prevent illness with healthy eating, regular exercise, and other steps. Follow-up care is a key part of your treatment and safety. Be sure to make and go to all appointments, and call your doctor if you are having problems. It's also a good idea to know your test results and keep a list of the medicines you take. How can you care for yourself at home? · Get screening tests that you and your doctor decide on. Screening helps find diseases before any symptoms appear. · Eat healthy foods. Choose fruits, vegetables, whole grains, protein, and low-fat dairy foods. Limit fat, especially saturated fat. Reduce salt in your diet. · Limit alcohol. Have no more than 1 drink a day or 7 drinks a week. · Get at least 30 minutes of exercise on most days of the week. Walking is a good choice. You also may want to do other activities, such as running, swimming, cycling, or playing tennis or team sports. · Reach and stay at a healthy weight. This will lower your risk for many problems, such as obesity, diabetes, heart disease, and high blood pressure. · Do not smoke. Smoking can make health problems worse. If you need help quitting, talk to your doctor about stop-smoking programs and medicines. These can increase your chances of quitting for good. · Care for your mental health. It is easy to get weighed down by worry and stress. Learn strategies to manage stress, like deep breathing and mindfulness, and stay connected with your family and community. If you find you often feel sad or hopeless, talk with your doctor. Treatment can help.   · Talk to your doctor about whether you have any risk factors for sexually transmitted infections (STIs). You can help prevent STIs if you wait to have sex with a new partner (or partners) until you've each been tested for STIs. It also helps if you use condoms (male or female condoms) and if you limit your sex partners to one person who only has sex with you. Vaccines are available for some STIs. · If you think you may have a problem with alcohol or drug use, talk to your doctor. This includes prescription medicines (such as amphetamines and opioids) and illegal drugs (such as cocaine and methamphetamine). Your doctor can help you figure out what type of treatment is best for you. · Protect your skin from too much sun. When you're outdoors from 10 a.m. to 4 p.m., stay in the shade or cover up with clothing and a hat with a wide brim. Wear sunglasses that block UV rays. Even when it's cloudy, put broad-spectrum sunscreen (SPF 30 or higher) on any exposed skin. · See a dentist one or two times a year for checkups and to have your teeth cleaned. · Wear a seat belt in the car. When should you call for help? Watch closely for changes in your health, and be sure to contact your doctor if you have any problems or symptoms that concern you. Where can you learn more? Go to https://Variable.healthCaseRevpartners. org and sign in to your [employer]om[employer] account. Enter L422 in the [facility] box to learn more about \"Well Visit, Women 50 to 72: Care Instructions. \"     If you do not have an account, please click on the \"Sign Up Now\" link. Current as of: May 27, 2020               Content Version: 12.8  © 2006-2021 Healthwise, [employer]. Care instructions adapted under license by [facility] ([facility]). If you have questions about a medical condition or this instruction, always ask your healthcare professional. [place] [address] any warranty or liability for your use of this information.          Patient Education        A Healthy Lifestyle: Care Instructions  Your Care Instructions     A healthy instead of the elevator at work. · If you smoke, quit. People who smoke have an increased risk for heart attack, stroke, cancer, and other lung illnesses. Quitting is hard, but there are ways to boost your chance of quitting tobacco for good. ? Use nicotine gum, patches, or lozenges. ? Ask your doctor about stop-smoking programs and medicines. ? Keep trying. In addition to reducing your risk of diseases in the future, you will notice some benefits soon after you stop using tobacco. If you have shortness of breath or asthma symptoms, they will likely get better within a few weeks after you quit. · Limit how much alcohol you drink. Moderate amounts of alcohol (up to 2 drinks a day for men, 1 drink a day for women) are okay. But drinking too much can lead to liver problems, high blood pressure, and other health problems. Family health  If you have a family, there are many things you can do together to improve your health. · Eat meals together as a family as often as possible. · Eat healthy foods. This includes fruits, vegetables, lean meats and dairy, and whole grains. · Include your family in your fitness plan. Most people think of activities such as jogging or tennis as the way to fitness, but there are many ways you and your family can be more active. Anything that makes you breathe hard and gets your heart pumping is exercise. Here are some tips:  ? Walk to do errands or to take your child to school or the bus.  ? Go for a family bike ride after dinner instead of watching TV. Where can you learn more? Go to https://ClimateminderwillisMyFit.Miira. org and sign in to your extraTKT account. Enter E350 in the [facility] box to learn more about \"A Healthy Lifestyle: Care Instructions. \"     If you do not have an account, please click on the \"Sign Up Now\" link. Current as of: September 23, 2020               Content Version: 12.8  © 3141-1804 Healthwise, Incorporated.    Care instructions adapted under license by [facility] ([facility]). If you have questions about a medical condition or this instruction, always ask your healthcare professional. [address] any warranty or liability for your use of this information.

## 2021-05-20 ENCOUNTER — IMMUNIZATION (OUTPATIENT)
Dept: PRIMARY CARE CLINIC | Age: 51
End: 2021-05-20
Payer: COMMERCIAL

## 2021-05-20 PROCEDURE — 91300 COVID-19, PFIZER VACCINE 30MCG/0.3ML DOSE: CPT | Performed by: INTERNAL MEDICINE

## 2021-05-20 PROCEDURE — 0001A COVID-19, PFIZER VACCINE 30MCG/0.3ML DOSE: CPT | Performed by: INTERNAL MEDICINE

## 2021-06-10 ENCOUNTER — IMMUNIZATION (OUTPATIENT)
Dept: PRIMARY CARE CLINIC | Age: 51
End: 2021-06-10
Payer: COMMERCIAL

## 2021-06-10 PROCEDURE — 91300 COVID-19, PFIZER VACCINE 30MCG/0.3ML DOSE: CPT | Performed by: INTERNAL MEDICINE

## 2021-06-10 PROCEDURE — 0002A COVID-19, PFIZER VACCINE 30MCG/0.3ML DOSE: CPT | Performed by: INTERNAL MEDICINE

## 2021-07-28 RX ORDER — LEVOTHYROXINE SODIUM 0.03 MG/1
TABLET ORAL
Qty: 90 TABLET | Refills: 3 | Status: SHIPPED | OUTPATIENT
Start: 2021-07-28 | End: 2022-07-22

## 2022-05-25 ENCOUNTER — OFFICE VISIT (OUTPATIENT)
Dept: PRIMARY CARE CLINIC | Age: 52
End: 2022-05-25
Payer: COMMERCIAL

## 2022-05-25 ENCOUNTER — TELEPHONE (OUTPATIENT)
Dept: PRIMARY CARE CLINIC | Age: 52
End: 2022-05-25

## 2022-05-25 VITALS
DIASTOLIC BLOOD PRESSURE: 68 MMHG | SYSTOLIC BLOOD PRESSURE: 110 MMHG | OXYGEN SATURATION: 98 % | BODY MASS INDEX: 25.81 KG/M2 | WEIGHT: 151.2 LBS | HEART RATE: 62 BPM | HEIGHT: 64 IN

## 2022-05-25 DIAGNOSIS — E55.9 VITAMIN D DEFICIENCY: ICD-10-CM

## 2022-05-25 DIAGNOSIS — E03.9 HYPOTHYROIDISM, UNSPECIFIED TYPE: Primary | ICD-10-CM

## 2022-05-25 DIAGNOSIS — Z11.59 ENCOUNTER FOR HEPATITIS C SCREENING TEST FOR LOW RISK PATIENT: ICD-10-CM

## 2022-05-25 DIAGNOSIS — E03.9 HYPOTHYROIDISM, UNSPECIFIED TYPE: ICD-10-CM

## 2022-05-25 DIAGNOSIS — M25.50 ARTHRALGIA, UNSPECIFIED JOINT: Primary | ICD-10-CM

## 2022-05-25 DIAGNOSIS — Z11.4 ENCOUNTER FOR SCREENING FOR HIV: ICD-10-CM

## 2022-05-25 DIAGNOSIS — M25.50 ARTHRALGIA, UNSPECIFIED JOINT: ICD-10-CM

## 2022-05-25 PROCEDURE — 99214 OFFICE O/P EST MOD 30 MIN: CPT | Performed by: FAMILY MEDICINE

## 2022-05-25 SDOH — ECONOMIC STABILITY: FOOD INSECURITY: WITHIN THE PAST 12 MONTHS, THE FOOD YOU BOUGHT JUST DIDN'T LAST AND YOU DIDN'T HAVE MONEY TO GET MORE.: NEVER TRUE

## 2022-05-25 SDOH — ECONOMIC STABILITY: FOOD INSECURITY: WITHIN THE PAST 12 MONTHS, YOU WORRIED THAT YOUR FOOD WOULD RUN OUT BEFORE YOU GOT MONEY TO BUY MORE.: NEVER TRUE

## 2022-05-25 ASSESSMENT — PATIENT HEALTH QUESTIONNAIRE - PHQ9
2. FEELING DOWN, DEPRESSED OR HOPELESS: 0
SUM OF ALL RESPONSES TO PHQ QUESTIONS 1-9: 0
1. LITTLE INTEREST OR PLEASURE IN DOING THINGS: 0
SUM OF ALL RESPONSES TO PHQ QUESTIONS 1-9: 0
SUM OF ALL RESPONSES TO PHQ9 QUESTIONS 1 & 2: 0

## 2022-05-25 ASSESSMENT — SOCIAL DETERMINANTS OF HEALTH (SDOH): HOW HARD IS IT FOR YOU TO PAY FOR THE VERY BASICS LIKE FOOD, HOUSING, MEDICAL CARE, AND HEATING?: NOT HARD AT ALL

## 2022-05-25 NOTE — PROGRESS NOTES
[address] [place] [address] PRIMARY CARE  [address] [place] [address] B  [address] [address]  Dept: [address] Marvin Han is a 47 y.o. female Established patient, who presents today for her medical conditions/complaintsas noted below. Chief Complaint   Patient presents with    Hypothyroidism     Pt here today to get blood work for her thyroid medication       HPI:     HPI  Fatigue x 1 month  No colds or flu sx   Eating healthy  Some sx of sjorgrens back: woozy and dizziness more so lately  Worse before her menses        Reviewed prior notes None  Reviewed previous Labs    LDL Cholesterol (mg/dL)   Date Value   03/13/2020 94       (goal LDL is <100)   AST (U/L)   Date Value   03/11/2014 16     ALT (U/L)   Date Value   03/11/2014 14     BUN (mg/dL)   Date Value   11/16/2015 9     TSH (mIU/L)   Date Value   03/23/2021 3.13     BP Readings from Last 3 Encounters:   05/25/22 110/68   05/18/21 114/62   03/24/21 124/72          (goal 120/80)    History reviewed. No pertinent past medical history. History reviewed. No pertinent surgical history. History reviewed. No pertinent family history. Social History     Tobacco Use    Smoking status: Never Smoker    Smokeless tobacco: Never Used   Substance Use Topics    Alcohol use: Not on file      Current Outpatient Medications   Medication Sig Dispense Refill    diclofenac sodium (VOLTAREN) 1 % GEL Apply 4 g topically 4 times daily 50 g 3    levothyroxine (SYNTHROID) 25 MCG tablet take 1 tablet by mouth once daily 90 tablet 3    trimethoprim (TRIMPEX) 100 MG tablet Take 1 tab by mouth at time of intercourse 20 tablet 0    valACYclovir (VALTREX) 1 g tablet Take 1,000 mg by mouth daily as needed   0    Cholecalciferol (VITAMIN D3) 5000 units TABS Take 1 tablet by mouth daily       No current facility-administered medications for this visit.      Allergies   Allergen Reactions    Ciprofloxacin        Health Maintenance   Topic Date Due    HIV screen  Never done    Hepatitis C screen  Never done    Colorectal Cancer Screen  Never done    Breast cancer screen  Never done    Shingles vaccine (1 of 2) Never done    Diabetes screen  05/30/2021    Depression Screen  03/24/2022    DTaP/Tdap/Td vaccine (1 - Tdap) 05/25/2023 (Originally 2/5/1989)    COVID-19 Vaccine (3 - Booster for Pfizer series) 05/25/2023 (Originally 11/10/2021)    Flu vaccine (Season Ended) 09/01/2022    Cervical cancer screen  07/16/2023    Lipids  03/13/2025    Hepatitis A vaccine  Aged Out    Hepatitis B vaccine  Aged Out    Hib vaccine  Aged Out    Meningococcal (ACWY) vaccine  Aged Out    Pneumococcal 0-64 years Vaccine  Aged Out       Subjective:      Review of Systems   Musculoskeletal: Positive for arthralgias. Right knee, worse at HS. finger jts yeimy the thumbs. Left shoulder pains and soreness   Psychiatric/Behavioral: Positive for sleep disturbance. Wakes up with knee pain, dog wakes her up       Objective:     /68   Pulse 62   Ht 5' 3.5\" (1.613 m)   Wt 151 lb 3.2 oz (68.6 kg)   SpO2 98%   BMI 26.36 kg/m²   Physical Exam  Vitals and nursing note reviewed. Constitutional:       General: She is not in acute distress. Appearance: She is well-developed. She is not ill-appearing. HENT:      Head: Normocephalic and atraumatic. Right Ear: External ear normal.      Left Ear: External ear normal.   Eyes:      General: No scleral icterus. Right eye: No discharge. Left eye: No discharge. Conjunctiva/sclera: Conjunctivae normal.   Neck:      Thyroid: No thyromegaly. Trachea: No tracheal deviation. Cardiovascular:      Rate and Rhythm: Normal rate and regular rhythm. Heart sounds: Normal heart sounds. Pulmonary:      Effort: Pulmonary effort is normal. No respiratory distress. Breath sounds: Normal breath sounds. No wheezing. Musculoskeletal:      Right lower leg: No edema.       Left lower leg: No edema. Comments: MCP jts first digits enlarged  Right knee jt enlarged, no effusion and no increase in warmth. Lymphadenopathy:      Cervical: No cervical adenopathy. Skin:     General: Skin is warm. Findings: No rash. Neurological:      Mental Status: She is alert and oriented to person, place, and time. Psychiatric:         Mood and Affect: Mood normal.         Behavior: Behavior normal.         Thought Content: Thought content normal.         Assessment:       Diagnosis Orders   1. Hypothyroidism, unspecified type  T4, Free    TSH   2. Arthralgia, unspecified joint  Cyclic Citrul Peptide Antibody, IgG    C-Reactive Protein    RACHEL Screen with Reflex    diclofenac sodium (VOLTAREN) 1 % GEL   3. Encounter for hepatitis C screening test for low risk patient  Hepatitis C Antibody   4. Encounter for screening for HIV  HIV Screen        Plan:      Return in about 1 year (around 5/25/2023). Stretching for hands and knees. Discussed Care gaps. She could be tired because of interrupted sleep between her joint pain and her dog. Orders Placed This Encounter   Procedures    T4, Free     Standing Status:   Future     Standing Expiration Date:   5/25/2023    TSH     Standing Status:   Future     Standing Expiration Date:   1/24/9504    Cyclic Citrul Peptide Antibody, IgG    C-Reactive Protein     Standing Status:   Future     Standing Expiration Date:   5/26/2023    RACHEL Screen with Reflex     Standing Status:   Future     Standing Expiration Date:   5/25/2023    HIV Screen     Standing Status:   Future     Standing Expiration Date:   5/25/2023    Hepatitis C Antibody     Standing Status:   Future     Standing Expiration Date:   5/25/2023     Orders Placed This Encounter   Medications    diclofenac sodium (VOLTAREN) 1 % GEL     Sig: Apply 4 g topically 4 times daily     Dispense:  50 g     Refill:  3       Patient given educationalmaterials - see patient instructions.   Discussed use, benefit, and side effectsof prescribed medications. All patient questions answered. Pt voiced understanding. Reviewed health maintenance. Instructed to continue current medications, diet andexercise. Patient agreed with treatment plan. Follow up as directed.      Electronicallysigned by Kiara Coffman MD on 5/25/2022 at 10:15 AM

## 2022-05-25 NOTE — PATIENT INSTRUCTIONS
Patient Education        Recombinant Zoster (Shingles) Vaccine: What You Need to Know  Why get vaccinated? Recombinant zoster (shingles) vaccine can prevent shingles. Shingles (also called herpes zoster, or just zoster) is a painful skin rash, usually with blisters. In addition to the rash, shingles can cause fever, headache, chills, or upset stomach. More rarely, shingles can lead to pneumonia, hearingproblems, blindness, brain inflammation (encephalitis), or death. The most common complication of shingles is long-term nerve pain called postherpetic neuralgia (PHN). PHN occurs in the areas where the shingles rash was, even after the rash clears up. It can last for months or years after therash goes away. The pain from PHN can be severe and debilitating. About 10 to 18% of people who get shingles will experience PHN. The risk of PHN increases with age. An older adult with shingles is more likely to develop PHN and have longer lasting and more severe pain than a younger person withshingles. Shingles is caused by the varicella zoster virus, the same virus that causes chickenpox. After you have chickenpox, the virus stays in your body and can cause shingles later in life. Shingles cannot be passed from one person to another, but the virus that causes shingles can spread and cause chickenpox insomeone who had never had chickenpox or received chickenpox vaccine. Recombinant shingles vaccine  Recombinant shingles vaccine provides strong protection against shingles. Bypreventing shingles, recombinant shingles vaccine also protects against PHN. Recombinant shingles vaccine is the preferred vaccine for the prevention of shingles. However, a different vaccine, live shingles vaccine, may be used in somecircumstances. The recombinant shingles vaccine is recommended for adults 50 years and older without serious immune problems. It is given as a two-dose series.   This vaccine is also recommended for people who have already gotten another type of shingles vaccine, the live shingles vaccine. There is no live virus inthis vaccine. Shingles vaccine may be given at the same time as other vaccines. Talk with your health care provider  Tell your vaccine provider if the person getting the vaccine:   Has had an allergic reaction after a previous dose of recombinant shingles vaccine, or has any severe, life-threatening allergies.  Is pregnant or breastfeeding.  Is currently experiencing an episode of shingles. In some cases, your health care provider may decide to postpone shinglesvaccination to a future visit. People with minor illnesses, such as a cold, may be vaccinated. People who are moderately or severely ill should usually wait until they recover beforegetting recombinant shingles vaccine. Your health care provider can give you more information. Risks of a vaccine reaction   A sore arm with mild or moderate pain is very common after recombinant shingles vaccine, affecting about 80% of vaccinated people. Redness and swelling can also happen at the site of the injection.  Tiredness, muscle pain, headache, shivering, fever, stomach pain, and nausea happen after vaccination in more than half of people who receive recombinant shingles vaccine. In clinical trials, about 1 out of 6 people who got recombinant zoster vaccine experienced side effects that prevented them from doing regular activities. Symptoms usually went away on their own in 2 to 3 days. You should still get the second dose of recombinant zoster vaccine even if youhad one of these reactions after the first dose. People sometimes faint after medical procedures, including vaccination. Tellyour provider if you feel dizzy or have vision changes or ringing in the ears. As with any medicine, there is a very remote chance of a vaccine causing asevere allergic reaction, other serious injury, or death. What if there is a serious problem?   An allergic reaction could occur after the vaccinated person leaves the clinic. If you see signs of a severe allergic reaction (hives, swelling of the face and throat, difficulty breathing, a fast heartbeat, dizziness, or weakness), call 9-1-1 and get the person to the nearest hospital.  For other signs that concern you, call your health care provider. Adverse reactions should be reported to the Vaccine Adverse Event Reporting System (VAERS). Your health care provider will usually file this report, or you can do it yourself. Visit the VAERS website at www.vaers. [facility].gov or call 6-998.479.3481. VAERS is only for reporting reactions, and VAERS staff do not give medical advice. How can I learn more?  Ask your health care provider.  Call your local or state health department.  Contact the Centers for Disease Control and Prevention (CDC):  ? Call 2-779.917.8293 (1-800-CDC-INFO) or  ? Visit CDC's website at www.cdc.gov/vaccines  Vaccine Information Statement  Recombinant Zoster Vaccine  10/30/2019  Formerly [facility] and [facility] for Disease Control and Prevention  Many Vaccine Information Statements are available in German and other languages. See www.immunize.org/vis. Hojas de Información Sobre Vacunas están disponibles en Español y en muchos otros idiomas. Visite Dorina.si   Care instructions adapted under license by [facility] ([facility]). If you have questions about a medical condition or this instruction, always ask your healthcare professional. [place] [address] any warranty or liability for your use of this information. Patient Education        Musculoskeletal Pain: Care Instructions  Your Care Instructions     Different problems with the bones, muscles, nerves, ligaments, and tendons in the body can cause pain. One or more areas of your body may ache or burn. Petra Early may feel tired, stiff, or sore. The medical term for this type of pain is musculoskeletal pain.  It can havemany different causes. Sometimes the pain is caused by an injury such as a strain or sprain. Or you might have pain from using one part of your body in the same way over and overagain. This is called overuse. In some cases, the cause of the pain is another health problem such asarthritis or fibromyalgia. The doctor will examine you and ask you questions about your health to help find the cause of your pain. Blood tests or imaging tests like an X-ray mayalso be helpful. But sometimes doctors can't find a cause of the pain. Treatment depends on your symptoms and the cause of the pain, if known. The doctor has checked you carefully, but problems can develop later. If you notice any problems or new symptoms, get medical treatment right away. Follow-up care is a key part of your treatment and safety. Be sure to make and go to all appointments, and call your doctor if you are having problems. It's also a good idea to know your test results and keep alist of the medicines you take. How can you care for yourself at home?  If you have new pain:  ? Rest until you feel better. ? Do not do anything that makes the pain worse. Return to exercise gradually if you feel better and your doctor says it's okay.  Be safe with medicines. Read and follow all instructions on the label. ? If the doctor gave you a prescription medicine for pain, take it as prescribed. ? If you are not taking a prescription pain medicine, ask your doctor if you can take an over-the-counter medicine.  Put heat or cold where your pain is, as needed. Use what helps you most. You can also switch between hot and cold packs. ? Apply heat 2 or 3 times a day for 20 to 30 minutes. This can be done using a heating pad, hot shower, or hot pack to relieve pain and stiffness. But don't use heat on a newly swollen joint. ? Put ice or a cold pack on the painful spot for 10 to 20 minutes at a time. Put a thin cloth between the ice and your skin.   When should you call for help? Call your doctor now or seek immediate medical care if:     You have new pain, or your pain gets worse.      You have new symptoms such as a fever, a rash, or chills. Watch closely for changes in your health, and be sure to contact your doctor if:     You do not get better as expected. Where can you learn more? Go to https://chpepiceweb.[employer]. org and sign in to your Picateers account. Enter T545 in the SocialDeck box to learn more about \"Musculoskeletal Pain: Care Instructions. \"     If you do not have an account, please click on the \"Sign Up Now\" link. Current as of: December 13, 2021               Content Version: 13.2  © 2006-2022 Healthwise, Incorporated. Care instructions adapted under license by [facility] ([facility]). If you have questions about a medical condition or this instruction, always ask your healthcare professional. [place] [address] any warranty or liability for your use of this information. Patient Education        Hand Arthritis: Exercises  Introduction  Here are some examples of exercises for you to try. The exercises may be suggested for a condition or for rehabilitation. Start each exercise slowly. Ease off the exercises if you start to have pain. You will be told when to start these exercises and which ones will work bestfor you. How to do the exercises  Tendon glides    1. In this exercise, the steps follow one another to a make a continuous movement. 2. With your affected hand, point your fingers and thumb straight up. Your wrist should be relaxed, following the line of your fingers and thumb. 3. Curl your fingers so that the top two joints in them are bent, and your fingers wrap down. Your fingertips should touch or be near the base of your fingers. Your fingers will look like a hook. 4. Make a fist by bending your knuckles. Your thumb can gently rest against your index (pointing) finger. 5.  Unwind your fingers slightly so that your fingertips can touch the base of your palm. Your thumb can rest against your index finger. 6. Move back to your starting position, with your fingers and thumb pointing up. 7. Repeat the series of motions 8 to 12 times. 8. Switch hands and repeat steps 1 through 6, even if only one hand is sore. Intrinsic flexion    1. Rest your affected hand on a table and bend the large joints where your fingers connect to your hand. Keep your thumb and the other joints in your fingers straight. 2. Slowly straighten your fingers. Your wrist should be relaxed, following the line of your fingers and thumb. 3. Move back to your starting position, with your hand bent. 4. Repeat 8 to 12 times. 5. Switch hands and repeat steps 1 through 4, even if only one hand is sore. Finger extension    1. Place your affected hand flat on a table. 2. Lift and then lower one finger at a time off the table. 3. Repeat 8 to 12 times. 4. Switch hands and repeat steps 1 through 3, even if only one hand is sore. MP extension    1. Place your good hand on a table, palm up. Put your affected hand on top of your good hand with your fingers wrapped around the thumb of your good hand like you are making a fist.  2. Slowly uncurl the joints of your affected hand where your fingers connect to your hand so that only the top two joints of your fingers are bent. Your fingers will look like a hook. 3. Move back to your starting position, with your fingers wrapped around your good thumb. 4. Repeat 8 to 12 times. 5. Switch hands and repeat steps 1 through 4, even if only one hand is sore. PIP extension (with MP extension)    1. Place your good hand on a table, palm up. Put your affected hand on top of your good hand, palm up. 2. Use the thumb and fingers of your good hand to grasp below the middle joint of one finger of your affected hand. 3. Straighten the last two joints of that finger.   4. Repeat 8 to 12 times.  5. Repeat steps 1 through 4 with each finger. 6. Switch hands and repeat steps 1 through 5, even if only one hand is sore. DIP flexion    1. With your good hand, grasp one finger of your affected hand. Your thumb will be on the top side of your finger just below the joint that is closest to your fingernail. 2. Slowly bend your affected finger only at the joint closest to your fingernail. 3. Repeat 8 to 12 times. 4. Repeat steps 1 through 3 with each finger. 5. Switch hands and repeat steps 1 through 4, even if only one hand is sore. Follow-up care is a key part of your treatment and safety. Be sure to make and go to all appointments, and call your doctor if you are having problems. It's also a good idea to know your test results and keep alist of the medicines you take. Where can you learn more? Go to https://[employer]. org and sign in to your TaposÃ©Â© account. Enter R941 in the Vino Volo box to learn more about \"Hand Arthritis: Exercises. \"     If you do not have an account, please click on the \"Sign Up Now\" link. Current as of: July 1, 2021               Content Version: 13.2  © 2006-2022 Healthwise, [employer]. Care instructions adapted under license by [facility] ([facility]). If you have questions about a medical condition or this instruction, always ask your healthcare professional. [place] [address] any warranty or liability for your use of this information. Patient Education        Thumb Arthritis: Exercises  Introduction  Here are some examples of exercises for you to try. The exercises may be suggested for a condition or for rehabilitation. Start each exercise slowly. Ease off the exercises if you start to have pain. You will be told when to start these exercises and which ones will work bestfor you. How to do the exercises  Thumb IP flexion    9. Place your forearm and hand on a table with your affected thumb pointing up.   10. With your other hand, hold your thumb steady just below the joint nearest your thumbnail. 11. Bend the tip of your thumb downward, then straighten it. 12. Repeat 8 to 12 times. 13. Switch hands and repeat steps 1 through 4, even if only one thumb is sore. Thumb MP flexion    6. Place your forearm and hand on a table with your affected thumb pointing up. 7. With your other hand, hold the base of your thumb and palm steady. 8. Bend your thumb downward where it meets your palm, then straighten it. 9. Repeat 8 to 12 times. 10. Switch hands and repeat steps 1 through 4, even if only one thumb is sore. Thumb opposition    5. With your affected hand, point your fingers and thumb straight up. Your wrist should be relaxed, following the line of your fingers and thumb. 6. Touch your affected thumb to each finger, one finger at a time. This will look like an \"okay\" sign, but try to keep your other fingers straight and pointing upward as much as you can. 7. Repeat 8 to 12 times. 8. Switch hands and repeat steps 1 through 3, even if only one thumb is sore. Follow-up care is a key part of your treatment and safety. Be sure to make and go to all appointments, and call your doctor if you are having problems. It's also a good idea to know your test results and keep alist of the medicines you take. Where can you learn more? Go to [email or web address]"[email or web address]. org and sign in to your GroupSwim account. Enter N818 in the [facility] box to learn more about \"Thumb Arthritis: Exercises. \"     If you do not have an account, please click on the \"Sign Up Now\" link. Current as of: July 1, 2021               Content Version: 13.2  © 8290-8932 Healthwise, Incorporated. Care instructions adapted under license by [facility] ([facility]).  If you have questions about a medical condition or this instruction, always ask your healthcare professional. [address] any warranty or liability for your use of this information.

## 2022-05-31 ENCOUNTER — HOSPITAL ENCOUNTER (OUTPATIENT)
Age: 52
Discharge: HOME | End: 2022-05-31
Payer: COMMERCIAL

## 2022-05-31 DIAGNOSIS — E55.9 VITAMIN D DEFICIENCY: ICD-10-CM

## 2022-05-31 DIAGNOSIS — E03.9 HYPOTHYROIDISM, UNSPECIFIED TYPE: ICD-10-CM

## 2022-05-31 DIAGNOSIS — Z11.4 ENCOUNTER FOR SCREENING FOR HIV: ICD-10-CM

## 2022-05-31 DIAGNOSIS — M25.50 ARTHRALGIA, UNSPECIFIED JOINT: ICD-10-CM

## 2022-05-31 DIAGNOSIS — Z11.59 ENCOUNTER FOR HEPATITIS C SCREENING TEST FOR LOW RISK PATIENT: ICD-10-CM

## 2022-05-31 LAB
C-REACTIVE PROTEIN: <3 MG/L (ref 0–5)
HEPATITIS C ANTIBODY: NONREACTIVE
HIV AG/AB: NONREACTIVE
THYROXINE, FREE: 1.18 NG/DL (ref 0.93–1.7)
TSH SERPL DL<=0.05 MIU/L-ACNC: 4.68 UIU/ML (ref 0.3–5)
VITAMIN D 25-HYDROXY: 43.2 NG/ML

## 2022-05-31 PROCEDURE — 36415 COLL VENOUS BLD VENIPUNCTURE: CPT

## 2022-05-31 PROCEDURE — 86140 C-REACTIVE PROTEIN: CPT

## 2022-05-31 PROCEDURE — 87389 HIV-1 AG W/HIV-1&-2 AB AG IA: CPT

## 2022-05-31 PROCEDURE — 86038 ANTINUCLEAR ANTIBODIES: CPT

## 2022-05-31 PROCEDURE — 82306 VITAMIN D 25 HYDROXY: CPT

## 2022-05-31 PROCEDURE — 84439 ASSAY OF FREE THYROXINE: CPT

## 2022-05-31 PROCEDURE — 86225 DNA ANTIBODY NATIVE: CPT

## 2022-05-31 PROCEDURE — 86803 HEPATITIS C AB TEST: CPT

## 2022-05-31 PROCEDURE — 86200 CCP ANTIBODY: CPT

## 2022-05-31 PROCEDURE — 86235 NUCLEAR ANTIGEN ANTIBODY: CPT

## 2022-05-31 PROCEDURE — 84443 ASSAY THYROID STIM HORMONE: CPT

## 2022-06-01 LAB
ANTI DNA DOUBLE STRANDED: 1.9 IU/ML
ANTI-NUCLEAR ANTIBODY (ANA): POSITIVE
CCP IGG ANTIBODIES: 2.6 U/ML (ref 0–7)
ENA ANTIBODIES SCREEN: 17 U/ML

## 2022-06-02 LAB
ANTI JO-1 IGG: <0.4 U/ML
ANTI SSA: >240 U/ML
ANTI SSB: 0.8 U/ML
ANTI-CENTROMERE: <0.4 U/ML
ANTI-RNP70: 0.8 U/ML
ANTI-SCLERODERMA: <0.6 U/ML
ANTI-SMITH: <0.8 U/ML
ANTI-U1RNP: 0.8 U/ML

## 2022-07-19 ENCOUNTER — TELEPHONE (OUTPATIENT)
Dept: PRIMARY CARE CLINIC | Age: 52
End: 2022-07-19

## 2022-07-19 DIAGNOSIS — M35.00 SJOGREN SYNDROME, UNSPECIFIED (HCC): Primary | ICD-10-CM

## 2022-07-19 NOTE — TELEPHONE ENCOUNTER
Her Rheumatologist retired. Needs new referral. Would like to see   Dr. Jill Washington.  [facility] for referral? no concerns

## 2022-07-22 RX ORDER — LEVOTHYROXINE SODIUM 0.03 MG/1
TABLET ORAL
Qty: 90 TABLET | Refills: 3 | Status: SHIPPED | OUTPATIENT
Start: 2022-07-22

## 2022-09-14 ENCOUNTER — PATIENT MESSAGE (OUTPATIENT)
Dept: PRIMARY CARE CLINIC | Age: 52
End: 2022-09-14

## 2022-09-14 DIAGNOSIS — R07.0 THROAT DISCOMFORT: ICD-10-CM

## 2022-09-14 DIAGNOSIS — K21.9 GASTROESOPHAGEAL REFLUX DISEASE, UNSPECIFIED WHETHER ESOPHAGITIS PRESENT: Primary | ICD-10-CM

## 2022-09-15 RX ORDER — FAMOTIDINE 40 MG/1
40 TABLET, FILM COATED ORAL EVERY EVENING
Qty: 30 TABLET | Refills: 3 | Status: SHIPPED | OUTPATIENT
Start: 2022-09-15 | End: 2022-09-23

## 2022-09-22 ENCOUNTER — HOSPITAL ENCOUNTER (OUTPATIENT)
Dept: GENERAL RADIOLOGY | Age: 52
Discharge: HOME | End: 2022-09-24
Payer: COMMERCIAL

## 2022-09-22 DIAGNOSIS — R07.0 THROAT DISCOMFORT: ICD-10-CM

## 2022-09-22 DIAGNOSIS — K21.9 GASTROESOPHAGEAL REFLUX DISEASE, UNSPECIFIED WHETHER ESOPHAGITIS PRESENT: ICD-10-CM

## 2022-09-22 PROCEDURE — 74220 X-RAY XM ESOPHAGUS 1CNTRST: CPT

## 2022-09-22 PROCEDURE — 2500000003 HC RX 250 WO HCPCS: Performed by: FAMILY MEDICINE

## 2022-09-22 RX ADMIN — BARIUM SULFATE 140 ML: 980 POWDER, FOR SUSPENSION ORAL at 14:28

## 2022-09-22 RX ADMIN — BARIUM SULFATE 50 ML: 960 POWDER, FOR SUSPENSION ORAL at 14:29

## 2022-09-23 DIAGNOSIS — K44.9 HIATAL HERNIA: Primary | ICD-10-CM

## 2022-09-23 RX ORDER — PANTOPRAZOLE SODIUM 40 MG/1
40 TABLET, DELAYED RELEASE ORAL
Qty: 30 TABLET | Refills: 5 | Status: SHIPPED | OUTPATIENT
Start: 2022-09-23

## 2023-07-20 RX ORDER — LEVOTHYROXINE SODIUM 0.03 MG/1
TABLET ORAL
Qty: 90 TABLET | Refills: 0 | Status: SHIPPED | OUTPATIENT
Start: 2023-07-20

## 2023-08-25 ENCOUNTER — OFFICE VISIT (OUTPATIENT)
Dept: PRIMARY CARE CLINIC | Age: 53
End: 2023-08-25
Payer: COMMERCIAL

## 2023-08-25 VITALS
HEART RATE: 60 BPM | DIASTOLIC BLOOD PRESSURE: 64 MMHG | HEIGHT: 64 IN | OXYGEN SATURATION: 97 % | WEIGHT: 139.4 LBS | SYSTOLIC BLOOD PRESSURE: 100 MMHG | BODY MASS INDEX: 23.8 KG/M2

## 2023-08-25 DIAGNOSIS — E03.9 HYPOTHYROIDISM, UNSPECIFIED TYPE: Primary | ICD-10-CM

## 2023-08-25 DIAGNOSIS — Z13.1 DIABETES MELLITUS SCREENING: ICD-10-CM

## 2023-08-25 DIAGNOSIS — Z13.220 SCREENING CHOLESTEROL LEVEL: ICD-10-CM

## 2023-08-25 DIAGNOSIS — E55.9 VITAMIN D DEFICIENCY: ICD-10-CM

## 2023-08-25 PROCEDURE — 99213 OFFICE O/P EST LOW 20 MIN: CPT | Performed by: FAMILY MEDICINE

## 2023-08-25 SDOH — ECONOMIC STABILITY: INCOME INSECURITY: HOW HARD IS IT FOR YOU TO PAY FOR THE VERY BASICS LIKE FOOD, HOUSING, MEDICAL CARE, AND HEATING?: NOT HARD AT ALL

## 2023-08-25 SDOH — ECONOMIC STABILITY: FOOD INSECURITY: WITHIN THE PAST 12 MONTHS, YOU WORRIED THAT YOUR FOOD WOULD RUN OUT BEFORE YOU GOT MONEY TO BUY MORE.: NEVER TRUE

## 2023-08-25 SDOH — ECONOMIC STABILITY: FOOD INSECURITY: WITHIN THE PAST 12 MONTHS, THE FOOD YOU BOUGHT JUST DIDN'T LAST AND YOU DIDN'T HAVE MONEY TO GET MORE.: NEVER TRUE

## 2023-08-25 SDOH — ECONOMIC STABILITY: HOUSING INSECURITY
IN THE LAST 12 MONTHS, WAS THERE A TIME WHEN YOU DID NOT HAVE A STEADY PLACE TO SLEEP OR SLEPT IN A SHELTER (INCLUDING NOW)?: NO

## 2023-08-25 ASSESSMENT — PATIENT HEALTH QUESTIONNAIRE - PHQ9
1. LITTLE INTEREST OR PLEASURE IN DOING THINGS: 0
SUM OF ALL RESPONSES TO PHQ9 QUESTIONS 1 & 2: 0
2. FEELING DOWN, DEPRESSED OR HOPELESS: 0
SUM OF ALL RESPONSES TO PHQ QUESTIONS 1-9: 0

## 2023-08-25 ASSESSMENT — ENCOUNTER SYMPTOMS: RESPIRATORY NEGATIVE: 1

## 2023-08-25 NOTE — PROGRESS NOTES
[address] PRIMARY CARE  [place] [address]  Dept: [address] Krishan S is a 47 y.o. female Established patient, who presents today for her medical conditions/complaintsas noted below. Chief Complaint   Patient presents with    Hypothyroidism     Pt is here for a f/u. HPI:     HPI  No med side effects    Reviewed prior notes None  Reviewed previous Labs and Imaging   Sees gyn Dr Tavo Bishop,  she orders the mammograms    Had colonoscopy at [facility] this year      LDL Cholesterol (mg/dL)   Date Value   03/13/2020 94       (goal LDL is <100)   AST (U/L)   Date Value   03/11/2014 16     ALT (U/L)   Date Value   03/11/2014 14     BUN (mg/dL)   Date Value   11/16/2015 9     TSH (uIU/mL)   Date Value   05/31/2022 4.68     BP Readings from Last 3 Encounters:   08/25/23 100/64   05/25/22 110/68   05/18/21 114/62          (goal 120/80)    History reviewed. No pertinent past medical history. History reviewed. No pertinent surgical history. History reviewed. No pertinent family history. Social History     Tobacco Use    Smoking status: Never    Smokeless tobacco: Never   Substance Use Topics    Alcohol use: Not on file      Current Outpatient Medications   Medication Sig Dispense Refill    levothyroxine (SYNTHROID) 25 MCG tablet take 1 tablet by mouth once daily 90 tablet 0    Cholecalciferol (VITAMIN D3) 5000 units TABS Take 1 tablet by mouth daily       No current facility-administered medications for this visit.      Allergies   Allergen Reactions    Ciprofloxacin        Health Maintenance   Topic Date Due    DTaP/Tdap/Td vaccine (1 - Tdap) Never done    Colorectal Cancer Screen  Never done    Breast cancer screen  Never done    Shingles vaccine (1 of 2) Never done    COVID-19 Vaccine (3 - Booster for Pfizer series) 08/05/2021    Depression Screen  05/25/2023    Cervical cancer screen  07/16/2023    Flu vaccine (1) 08/01/2023    Lipids

## 2023-09-12 ENCOUNTER — HOSPITAL ENCOUNTER (OUTPATIENT)
Age: 53
Discharge: HOME | End: 2023-09-12
Payer: COMMERCIAL

## 2023-09-12 DIAGNOSIS — Z13.220 SCREENING CHOLESTEROL LEVEL: ICD-10-CM

## 2023-09-12 DIAGNOSIS — E03.9 HYPOTHYROIDISM, UNSPECIFIED TYPE: ICD-10-CM

## 2023-09-12 DIAGNOSIS — Z13.1 DIABETES MELLITUS SCREENING: ICD-10-CM

## 2023-09-12 DIAGNOSIS — E55.9 VITAMIN D DEFICIENCY: ICD-10-CM

## 2023-09-12 LAB
25(OH)D3 SERPL-MCNC: 47 NG/ML (ref 30–100)
CHOLEST SERPL-MCNC: 218 MG/DL
CHOLESTEROL/HDL RATIO: 3.1
GLUCOSE P FAST SERPL-MCNC: 100 MG/DL (ref 70–99)
HDLC SERPL-MCNC: 71 MG/DL
LDLC SERPL CALC-MCNC: 129 MG/DL (ref 0–130)
T4 FREE SERPL-MCNC: 1.2 NG/DL (ref 0.9–1.7)
TRIGL SERPL-MCNC: 90 MG/DL
TSH SERPL DL<=0.05 MIU/L-ACNC: 2.73 UIU/ML (ref 0.3–5)

## 2023-09-12 PROCEDURE — 82306 VITAMIN D 25 HYDROXY: CPT

## 2023-09-12 PROCEDURE — 80061 LIPID PANEL: CPT

## 2023-09-12 PROCEDURE — 84443 ASSAY THYROID STIM HORMONE: CPT

## 2023-09-12 PROCEDURE — 36415 COLL VENOUS BLD VENIPUNCTURE: CPT

## 2023-09-12 PROCEDURE — 82947 ASSAY GLUCOSE BLOOD QUANT: CPT

## 2023-09-12 PROCEDURE — 84439 ASSAY OF FREE THYROXINE: CPT

## 2023-10-13 RX ORDER — LEVOTHYROXINE SODIUM 0.03 MG/1
TABLET ORAL
Qty: 90 TABLET | Refills: 2 | Status: SHIPPED | OUTPATIENT
Start: 2023-10-13

## 2024-07-17 DIAGNOSIS — E03.9 HYPOTHYROIDISM, UNSPECIFIED TYPE: Primary | ICD-10-CM

## 2024-07-17 RX ORDER — LEVOTHYROXINE SODIUM 0.03 MG/1
25 TABLET ORAL DAILY
Qty: 90 TABLET | Refills: 0 | Status: SHIPPED | OUTPATIENT
Start: 2024-07-17 | End: 2024-07-18 | Stop reason: SDUPTHER

## 2024-07-18 DIAGNOSIS — E03.9 HYPOTHYROIDISM, UNSPECIFIED TYPE: ICD-10-CM

## 2024-07-18 RX ORDER — LEVOTHYROXINE SODIUM 0.03 MG/1
25 TABLET ORAL DAILY
Qty: 90 TABLET | Refills: 0 | Status: SHIPPED | OUTPATIENT
Start: 2024-07-18

## 2024-08-07 ENCOUNTER — OFFICE VISIT (OUTPATIENT)
Dept: PRIMARY CARE CLINIC | Age: 54
End: 2024-08-07
Payer: COMMERCIAL

## 2024-08-07 VITALS
HEART RATE: 52 BPM | HEIGHT: 64 IN | WEIGHT: 135 LBS | DIASTOLIC BLOOD PRESSURE: 80 MMHG | SYSTOLIC BLOOD PRESSURE: 110 MMHG | BODY MASS INDEX: 23.05 KG/M2

## 2024-08-07 DIAGNOSIS — E55.9 VITAMIN D DEFICIENCY: Chronic | ICD-10-CM

## 2024-08-07 DIAGNOSIS — F51.01 PRIMARY INSOMNIA: ICD-10-CM

## 2024-08-07 DIAGNOSIS — E03.9 HYPOTHYROIDISM, UNSPECIFIED TYPE: Primary | ICD-10-CM

## 2024-08-07 DIAGNOSIS — Z13.1 DIABETES MELLITUS SCREENING: ICD-10-CM

## 2024-08-07 DIAGNOSIS — R53.83 OTHER FATIGUE: ICD-10-CM

## 2024-08-07 PROCEDURE — 99214 OFFICE O/P EST MOD 30 MIN: CPT | Performed by: FAMILY MEDICINE

## 2024-08-07 RX ORDER — VALACYCLOVIR HYDROCHLORIDE 1 G/1
TABLET, FILM COATED ORAL
COMMUNITY
Start: 2024-05-15 | End: 2024-08-07

## 2024-08-07 RX ORDER — TRAZODONE HYDROCHLORIDE 50 MG/1
25 TABLET ORAL NIGHTLY
Qty: 30 TABLET | Refills: 0 | Status: SHIPPED | OUTPATIENT
Start: 2024-08-07 | End: 2024-10-06

## 2024-08-07 ASSESSMENT — PATIENT HEALTH QUESTIONNAIRE - PHQ9
SUM OF ALL RESPONSES TO PHQ9 QUESTIONS 1 & 2: 0
1. LITTLE INTEREST OR PLEASURE IN DOING THINGS: NOT AT ALL
2. FEELING DOWN, DEPRESSED OR HOPELESS: NOT AT ALL
SUM OF ALL RESPONSES TO PHQ QUESTIONS 1-9: 0

## 2024-08-07 NOTE — PROGRESS NOTES
[facility]  [facility] PRIMARY CARE  [address] [address]  Dept: [phone]    Nuvia Allen is a 54 y.o. female Established patient, who presents today for her medical conditions/complaintsas noted below.      Chief Complaint   Patient presents with    Hypothyroidism     Pt is here for a 1 year f/u.        HPI:     HPI    Reviewed prior notes None  Reviewed previous Labs    Feeling more tired. Doesn't sleep well. Has trouble turning her mind off    Trouble falling asleep and wakes up and can't get back asleep.  Sometimes wakes up at 3 AM  Tried melatonin gummies at HS (10mg)  Getting dizzy and lightheaded off and on, worse with having periods, monthly    No components found for: \"LDLCHOLESTEROL\", \"LDLCALC\"    (goal LDL is <100)   AST (U/L)   Date Value   03/11/2014 16     ALT (U/L)   Date Value   03/11/2014 14     BUN (mg/dL)   Date Value   11/16/2015 9     TSH (uIU/mL)   Date Value   09/12/2023 2.73     BP Readings from Last 3 Encounters:   08/07/24 110/80   08/25/23 100/64   05/25/22 110/68          (goal 120/80)    History reviewed. No pertinent past medical history.   History reviewed. No pertinent surgical history.    History reviewed. No pertinent family history.    Social History     Tobacco Use    Smoking status: Never    Smokeless tobacco: Never   Substance Use Topics    Alcohol use: Not on file      Current Outpatient Medications   Medication Sig Dispense Refill    traZODone (DESYREL) 50 MG tablet Take 0.5 tablets by mouth nightly 30 tablet 0    levothyroxine (SYNTHROID) 25 MCG tablet Take 1 tablet by mouth daily 90 tablet 0    Cholecalciferol (VITAMIN D3) 5000 units TABS Take 1 tablet by mouth daily       No current facility-administered medications for this visit.     Allergies   Allergen Reactions    Ciprofloxacin        Health Maintenance   Topic Date Due    Hepatitis B vaccine (1 of 3 - 3-dose series) Never done    DTaP/Tdap/Td vaccine (1 - Tdap)

## 2024-08-23 ENCOUNTER — HOSPITAL ENCOUNTER (OUTPATIENT)
Age: 54
Discharge: HOME | End: 2024-08-23
Payer: COMMERCIAL

## 2024-08-23 DIAGNOSIS — R53.83 OTHER FATIGUE: ICD-10-CM

## 2024-08-23 DIAGNOSIS — E03.9 HYPOTHYROIDISM, UNSPECIFIED TYPE: ICD-10-CM

## 2024-08-23 DIAGNOSIS — E55.9 VITAMIN D DEFICIENCY: Chronic | ICD-10-CM

## 2024-08-23 DIAGNOSIS — Z13.1 DIABETES MELLITUS SCREENING: ICD-10-CM

## 2024-08-23 LAB
25(OH)D3 SERPL-MCNC: 38.8 NG/ML (ref 30–100)
BASOPHILS # BLD: 0.1 K/UL (ref 0–0.2)
BASOPHILS NFR BLD: 1 % (ref 0–2)
EOSINOPHIL # BLD: 0.1 K/UL (ref 0–0.4)
EOSINOPHILS RELATIVE PERCENT: 2 % (ref 0–4)
ERYTHROCYTE [DISTWIDTH] IN BLOOD BY AUTOMATED COUNT: 13.4 % (ref 11.5–14.9)
GLUCOSE P FAST SERPL-MCNC: 81 MG/DL (ref 70–99)
HCT VFR BLD AUTO: 42.9 % (ref 36–46)
HGB BLD-MCNC: 14 G/DL (ref 12–16)
LYMPHOCYTES NFR BLD: 1.9 K/UL (ref 1–4.8)
LYMPHOCYTES RELATIVE PERCENT: 38 % (ref 24–44)
MCH RBC QN AUTO: 32 PG (ref 26–34)
MCHC RBC AUTO-ENTMCNC: 32.7 G/DL (ref 31–37)
MCV RBC AUTO: 97.8 FL (ref 80–100)
MONOCYTES NFR BLD: 0.5 K/UL (ref 0.1–1.3)
MONOCYTES NFR BLD: 10 % (ref 1–7)
NEUTROPHILS NFR BLD: 49 % (ref 36–66)
NEUTS SEG NFR BLD: 2.4 K/UL (ref 1.3–9.1)
PLATELET # BLD AUTO: 303 K/UL (ref 150–450)
PMV BLD AUTO: 8.9 FL (ref 6–12)
RBC # BLD AUTO: 4.39 M/UL (ref 4–5.2)
T4 FREE SERPL-MCNC: 1.1 NG/DL (ref 0.9–1.7)
TSH SERPL DL<=0.05 MIU/L-ACNC: 2.59 UIU/ML (ref 0.3–5)
VIT B12 SERPL-MCNC: 814 PG/ML (ref 232–1245)
WBC OTHER # BLD: 5 K/UL (ref 3.5–11)

## 2024-08-23 PROCEDURE — 85025 COMPLETE CBC W/AUTO DIFF WBC: CPT

## 2024-08-23 PROCEDURE — 84443 ASSAY THYROID STIM HORMONE: CPT

## 2024-08-23 PROCEDURE — 82607 VITAMIN B-12: CPT

## 2024-08-23 PROCEDURE — 82306 VITAMIN D 25 HYDROXY: CPT

## 2024-08-23 PROCEDURE — 84439 ASSAY OF FREE THYROXINE: CPT

## 2024-08-23 PROCEDURE — 82947 ASSAY GLUCOSE BLOOD QUANT: CPT

## 2024-08-23 PROCEDURE — 36415 COLL VENOUS BLD VENIPUNCTURE: CPT

## 2024-08-24 NOTE — RESULT ENCOUNTER NOTE
B12 is normal, VIt D is low end of normal, should probably increase VIt D, if she is taking 5000 units daily then try taking 10,000 units twice a week and 5000 5 days a week.

## 2024-10-09 DIAGNOSIS — E03.9 HYPOTHYROIDISM, UNSPECIFIED TYPE: ICD-10-CM

## 2024-10-09 RX ORDER — LEVOTHYROXINE SODIUM 25 UG/1
25 TABLET ORAL DAILY
Qty: 90 TABLET | Refills: 1 | Status: SHIPPED | OUTPATIENT
Start: 2024-10-09

## 2024-12-03 ENCOUNTER — E-VISIT (OUTPATIENT)
Dept: PRIMARY CARE CLINIC | Age: 54
End: 2024-12-03

## 2024-12-03 DIAGNOSIS — J01.90 ACUTE NON-RECURRENT SINUSITIS, UNSPECIFIED LOCATION: Primary | ICD-10-CM

## 2024-12-03 RX ORDER — AMOXICILLIN 500 MG/1
500 CAPSULE ORAL 3 TIMES DAILY
Qty: 30 CAPSULE | Refills: 0 | Status: SHIPPED | OUTPATIENT
Start: 2024-12-03 | End: 2024-12-13

## 2024-12-03 ASSESSMENT — LIFESTYLE VARIABLES: SMOKING_STATUS: NO, I'VE NEVER SMOKED

## 2024-12-03 NOTE — PROGRESS NOTES
Clinically does sound like could be sinus infection with worsening.  Amoxicillin antibiotic sent in.  Patient to follow-up in office as needed no improvement

## 2024-12-06 ENCOUNTER — TELEPHONE (OUTPATIENT)
Dept: PRIMARY CARE CLINIC | Age: 54
End: 2024-12-06

## 2024-12-06 NOTE — TELEPHONE ENCOUNTER
In order that you don't get another charge for an evisit,just send additional message via my chart  Which nasal spray are you using  ? If it's a nasal decongestant like afrin: you have to stop that.  Use nasaocort or flonase which are safe to use long term  Also use saline nose spray  Dr Strauss.

## 2025-02-24 ENCOUNTER — OFFICE VISIT (OUTPATIENT)
Dept: PRIMARY CARE CLINIC | Age: 55
End: 2025-02-24
Payer: COMMERCIAL

## 2025-02-24 VITALS
OXYGEN SATURATION: 98 % | WEIGHT: 139 LBS | BODY MASS INDEX: 23.73 KG/M2 | HEART RATE: 56 BPM | SYSTOLIC BLOOD PRESSURE: 120 MMHG | HEIGHT: 64 IN | DIASTOLIC BLOOD PRESSURE: 80 MMHG

## 2025-02-24 DIAGNOSIS — E03.9 HYPOTHYROIDISM, UNSPECIFIED TYPE: ICD-10-CM

## 2025-02-24 DIAGNOSIS — M25.512 LEFT SHOULDER PAIN, UNSPECIFIED CHRONICITY: Primary | ICD-10-CM

## 2025-02-24 PROCEDURE — 99214 OFFICE O/P EST MOD 30 MIN: CPT | Performed by: STUDENT IN AN ORGANIZED HEALTH CARE EDUCATION/TRAINING PROGRAM

## 2025-02-24 RX ORDER — VALACYCLOVIR HYDROCHLORIDE 1 G/1
1000 TABLET, FILM COATED ORAL DAILY
COMMUNITY
Start: 2025-02-04

## 2025-02-24 SDOH — ECONOMIC STABILITY: FOOD INSECURITY: WITHIN THE PAST 12 MONTHS, YOU WORRIED THAT YOUR FOOD WOULD RUN OUT BEFORE YOU GOT MONEY TO BUY MORE.: NEVER TRUE

## 2025-02-24 SDOH — ECONOMIC STABILITY: FOOD INSECURITY: WITHIN THE PAST 12 MONTHS, THE FOOD YOU BOUGHT JUST DIDN'T LAST AND YOU DIDN'T HAVE MONEY TO GET MORE.: NEVER TRUE

## 2025-02-24 ASSESSMENT — ENCOUNTER SYMPTOMS
NAUSEA: 0
VOMITING: 0
SHORTNESS OF BREATH: 0
ABDOMINAL PAIN: 0

## 2025-02-24 ASSESSMENT — PATIENT HEALTH QUESTIONNAIRE - PHQ9
SUM OF ALL RESPONSES TO PHQ QUESTIONS 1-9: 0
SUM OF ALL RESPONSES TO PHQ QUESTIONS 1-9: 0
2. FEELING DOWN, DEPRESSED OR HOPELESS: NOT AT ALL
SUM OF ALL RESPONSES TO PHQ QUESTIONS 1-9: 0
SUM OF ALL RESPONSES TO PHQ9 QUESTIONS 1 & 2: 0
SUM OF ALL RESPONSES TO PHQ QUESTIONS 1-9: 0
DEPRESSION UNABLE TO ASSESS: FUNCTIONAL CAPACITY MOTIVATION LIMITS ACCURACY
1. LITTLE INTEREST OR PLEASURE IN DOING THINGS: NOT AT ALL

## 2025-02-24 NOTE — PROGRESS NOTES
[facility]  [facility] PRIMARY CARE  [address] [address]  Dept: [phone]    Nuvia Allen is a 55 y.o. female established patient, who presents acutely for her complaints as noted below:    Chief Complaint   Patient presents with    Shoulder Pain     Patient states they the following concerns left shoulder pain        HPI:     Left shoulder pain - a few weeks in duration. Denies any specific injury. Pain when reaching back to put on her coat.     Hypothyroidism: managed with levothyroxine 25 mcg daily    Reviewed prior notes from PCP.  Reviewed previous labs.    No components found for: \"LDLCHOLESTEROL\", \"LDLCALC\"    (goal LDL is <100)   AST (U/L)   Date Value   03/11/2014 16     ALT (U/L)   Date Value   03/11/2014 14     BUN (mg/dL)   Date Value   11/16/2015 9     TSH (uIU/mL)   Date Value   08/23/2024 2.59     BP Readings from Last 3 Encounters:   02/24/25 120/80   08/07/24 110/80   08/25/23 100/64          (goal 120/80)    No past medical history on file.   No past surgical history on file.    No family history on file.    Social History     Tobacco Use    Smoking status: Never    Smokeless tobacco: Never   Substance Use Topics    Alcohol use: Not on file      Current Outpatient Medications   Medication Sig Dispense Refill    valACYclovir (VALTREX) 1 g tablet Take 1 tablet by mouth daily      levothyroxine (SYNTHROID) 25 MCG tablet TAKE 1 TABLET BY MOUTH DAILY 90 tablet 1    Cholecalciferol (VITAMIN D3) 125 MCG (5000 UT) TABS Take 2 tablets by mouth Twice a Week AND 1 tablet Five times weekly.       No current facility-administered medications for this visit.     Allergies   Allergen Reactions    Ciprofloxacin        Health Maintenance   Topic Date Due    Hepatitis B vaccine (1 of 3 - 19+ 3-dose series) Never done    DTaP/Tdap/Td vaccine (1 - Tdap) Never done    Shingles vaccine (1 of 2) Never done    Pneumococcal 50+ years Vaccine (1 of 1 - PCV) Never done

## 2025-03-17 DIAGNOSIS — E03.9 HYPOTHYROIDISM, UNSPECIFIED TYPE: ICD-10-CM

## 2025-03-17 RX ORDER — LEVOTHYROXINE SODIUM 25 UG/1
25 TABLET ORAL DAILY
Qty: 90 TABLET | Refills: 0 | Status: SHIPPED | OUTPATIENT
Start: 2025-03-17

## 2025-05-04 ENCOUNTER — PATIENT MESSAGE (OUTPATIENT)
Dept: PRIMARY CARE CLINIC | Age: 55
End: 2025-05-04

## 2025-05-04 DIAGNOSIS — H01.119 EYELID DERMATITIS, ALLERGIC/CONTACT: Primary | ICD-10-CM

## 2025-05-06 RX ORDER — DESONIDE 0.5 MG/G
CREAM TOPICAL
Qty: 15 G | Refills: 0 | Status: SHIPPED | OUTPATIENT
Start: 2025-05-06

## 2025-05-29 ENCOUNTER — OFFICE VISIT (OUTPATIENT)
Dept: PRIMARY CARE CLINIC | Age: 55
End: 2025-05-29
Payer: COMMERCIAL

## 2025-05-29 VITALS
WEIGHT: 139 LBS | DIASTOLIC BLOOD PRESSURE: 80 MMHG | SYSTOLIC BLOOD PRESSURE: 120 MMHG | HEIGHT: 64 IN | OXYGEN SATURATION: 99 % | BODY MASS INDEX: 23.73 KG/M2 | HEART RATE: 64 BPM

## 2025-05-29 DIAGNOSIS — F51.01 PRIMARY INSOMNIA: ICD-10-CM

## 2025-05-29 DIAGNOSIS — Z12.31 BREAST CANCER SCREENING BY MAMMOGRAM: ICD-10-CM

## 2025-05-29 DIAGNOSIS — E03.9 HYPOTHYROIDISM, UNSPECIFIED TYPE: Primary | ICD-10-CM

## 2025-05-29 DIAGNOSIS — M35.00 SJOGREN'S SYNDROME, WITH UNSPECIFIED ORGAN INVOLVEMENT: ICD-10-CM

## 2025-05-29 DIAGNOSIS — E55.9 VITAMIN D DEFICIENCY: ICD-10-CM

## 2025-05-29 DIAGNOSIS — L20.9 ATOPIC DERMATITIS, UNSPECIFIED TYPE: ICD-10-CM

## 2025-05-29 PROCEDURE — 99214 OFFICE O/P EST MOD 30 MIN: CPT | Performed by: FAMILY MEDICINE

## 2025-05-29 NOTE — PROGRESS NOTES
[facility]  [facility] [place] [facility]  [address]  [place] [address]  Dept: [phone]    Nuvia Allen is a 55 y.o. female Established patient, who presents today for her medical conditions/complaintsas noted below.      Chief Complaint   Patient presents with    Hypothyroidism    Shoulder Pain     Pt is here for a 3 Month f/u.        HPI:     HPI    Reviewed prior notes None  Reviewed previous Labs and Imaging  Feeling dizzy for the last 2 months, off balance off and on.   Tired more x 2 months.   Sometimes wakes up tired and sometimes feels tired 2 hrs after getting up  Has been trying melatonin off and on.   Wakes up about 430 and can't get back to sleep.  Goes to bed 1130 pm    5 months without menses recently.     Has Sjogren.    No components found for: \"LDLCHOLESTEROL\", \"LDLCALC\"    (goal LDL is <100)   AST (U/L)   Date Value   03/11/2014 16     ALT (U/L)   Date Value   03/11/2014 14     BUN (mg/dL)   Date Value   11/16/2015 9     TSH (uIU/mL)   Date Value   08/23/2024 2.59     BP Readings from Last 3 Encounters:   05/29/25 120/80   02/24/25 120/80   08/07/24 110/80          (goal 120/80)    History reviewed. No pertinent past medical history.   History reviewed. No pertinent surgical history.    History reviewed. No pertinent family history.    Social History     Tobacco Use    Smoking status: Never    Smokeless tobacco: Never   Substance Use Topics    Alcohol use: Not on file      Current Outpatient Medications   Medication Sig Dispense Refill    desonide (DESOWEN) 0.05 % cream Apply topically very thinnly 2 times daily for not more than 1 week. 15 g 0    levothyroxine (SYNTHROID) 25 MCG tablet TAKE 1 TABLET BY MOUTH DAILY 90 tablet 0    valACYclovir (VALTREX) 1 g tablet Take 1 tablet by mouth daily      Cholecalciferol (VITAMIN D3) 125 MCG (5000 UT) TABS Take 2 tablets by mouth Twice a Week AND 1 tablet Five times weekly.       No current facility-administered

## 2025-07-16 DIAGNOSIS — E03.9 HYPOTHYROIDISM, UNSPECIFIED TYPE: ICD-10-CM

## 2025-07-16 RX ORDER — LEVOTHYROXINE SODIUM 25 UG/1
25 TABLET ORAL DAILY
Qty: 90 TABLET | Refills: 0 | Status: SHIPPED | OUTPATIENT
Start: 2025-07-16